# Patient Record
Sex: MALE | Race: BLACK OR AFRICAN AMERICAN | Employment: UNEMPLOYED | URBAN - METROPOLITAN AREA
[De-identification: names, ages, dates, MRNs, and addresses within clinical notes are randomized per-mention and may not be internally consistent; named-entity substitution may affect disease eponyms.]

---
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
## 2018-01-18 ENCOUNTER — APPOINTMENT (OUTPATIENT)
Dept: ULTRASOUND IMAGING | Age: 18
End: 2018-01-18
Payer: MEDICARE

## 2018-01-18 ENCOUNTER — HOSPITAL ENCOUNTER (EMERGENCY)
Age: 18
Discharge: HOME | End: 2018-01-18
Attending: EMERGENCY MEDICINE
Payer: MEDICARE

## 2018-01-18 VITALS
BODY MASS INDEX: 31.24 KG/M2 | RESPIRATION RATE: 18 BRPM | TEMPERATURE: 97.9 F | SYSTOLIC BLOOD PRESSURE: 142 MMHG | HEIGHT: 74 IN | OXYGEN SATURATION: 98 % | DIASTOLIC BLOOD PRESSURE: 88 MMHG | WEIGHT: 243.39 LBS | HEART RATE: 87 BPM

## 2018-01-18 DIAGNOSIS — L98.491 ULCER OF RIGHT GROIN, LIMITED TO BREAKDOWN OF SKIN (HCC): Primary | ICD-10-CM

## 2018-01-18 DIAGNOSIS — L98.499: ICD-10-CM

## 2018-01-18 DIAGNOSIS — N50.819 TESTICULAR PAIN, UNSPECIFIED: ICD-10-CM

## 2018-01-18 LAB
-: ABNORMAL
AMORPHOUS: ABNORMAL
BACTERIA: ABNORMAL
BILIRUBIN URINE: NEGATIVE
CASTS UA: ABNORMAL /LPF
COLOR: YELLOW
COMMENT UA: ABNORMAL
CRYSTALS, UA: ABNORMAL /HPF
EPITHELIAL CELLS UA: ABNORMAL /HPF (ref 0–5)
GLUCOSE URINE: NEGATIVE
KETONES, URINE: NEGATIVE
LEUKOCYTE ESTERASE, URINE: NEGATIVE
MUCUS: ABNORMAL
NITRITE, URINE: NEGATIVE
OTHER OBSERVATIONS UA: ABNORMAL
PH UA: 7 (ref 5–8)
PROTEIN UA: NEGATIVE
RBC UA: ABNORMAL /HPF (ref 0–2)
RENAL EPITHELIAL, UA: ABNORMAL /HPF
SPECIFIC GRAVITY UA: 1.02 (ref 1–1.03)
TRICHOMONAS: ABNORMAL
TURBIDITY: ABNORMAL
URINE HGB: NEGATIVE
UROBILINOGEN, URINE: ABNORMAL
WBC UA: ABNORMAL /HPF (ref 0–5)
YEAST: ABNORMAL

## 2018-01-18 PROCEDURE — 87491 CHLMYD TRACH DNA AMP PROBE: CPT

## 2018-01-18 PROCEDURE — 93976 VASCULAR STUDY: CPT

## 2018-01-18 PROCEDURE — 81001 URINALYSIS AUTO W/SCOPE: CPT

## 2018-01-18 PROCEDURE — 87591 N.GONORRHOEAE DNA AMP PROB: CPT

## 2018-01-18 PROCEDURE — 99284 EMERGENCY DEPT VISIT MOD MDM: CPT

## 2018-01-18 PROCEDURE — 76870 US EXAM SCROTUM: CPT

## 2018-01-18 RX ORDER — DOXYCYCLINE HYCLATE 100 MG
100 TABLET ORAL 2 TIMES DAILY
Qty: 20 TABLET | Refills: 0 | Status: SHIPPED | OUTPATIENT
Start: 2018-01-18 | End: 2018-06-02 | Stop reason: ALTCHOICE

## 2018-01-18 RX ORDER — IBUPROFEN 600 MG/1
600 TABLET ORAL EVERY 8 HOURS PRN
Qty: 21 TABLET | Refills: 0 | Status: SHIPPED | OUTPATIENT
Start: 2018-01-18 | End: 2018-06-02 | Stop reason: ALTCHOICE

## 2018-01-18 ASSESSMENT — ENCOUNTER SYMPTOMS
RESPIRATORY NEGATIVE: 1
ALLERGIC/IMMUNOLOGIC NEGATIVE: 1
EYES NEGATIVE: 1
GASTROINTESTINAL NEGATIVE: 1

## 2018-01-18 ASSESSMENT — PAIN DESCRIPTION - DESCRIPTORS: DESCRIPTORS: ACHING;CONSTANT;PRESSURE

## 2018-01-18 ASSESSMENT — PAIN SCALES - GENERAL: PAINLEVEL_OUTOF10: 10

## 2018-01-18 ASSESSMENT — PAIN DESCRIPTION - FREQUENCY: FREQUENCY: CONTINUOUS

## 2018-01-18 NOTE — ED PROVIDER NOTES
Decision To Discharge 01/18/2018 02:32:26 PM      PATIENT REFERRED TO:   Sj Geller MD  [address]  [phone]    In 1 week      Edgard Garcia MD  [address] [place] [address]  [phone]    Schedule an appointment as soon as possible for a visit in 1 week        DISCHARGE MEDICATIONS:     New Prescriptions    DOXYCYCLINE HYCLATE (VIBRA-TABS) 100 MG TABLET    Take 1 tablet by mouth 2 times daily    IBUPROFEN (ADVIL;MOTRIN) 600 MG TABLET    Take 1 tablet by mouth every 8 hours as needed for Pain    NEOMYCIN-POLYMYXIN-PRAMOXINE (NEOSPORIN PLUS) 1 % CREAM    Apply topically 2 times daily.            (Please note that portions of this note were completed with a voice recognition program.  Efforts were made to edit the dictations but occasionally words are mis-transcribed.)    Krish Fay MD  Attending Emergency Physician         Krish Fay MD  01/18/18 7569

## 2018-01-18 NOTE — ED NOTES
Ill several days with lower abd/scrotal and rectal pain. Denies injury no fever nausea or urinary difficulties. abd soft.      Gabino Galos, RN  01/18/18 8534

## 2018-01-22 LAB
C. TRACHOMATIS DNA ,URINE: NEGATIVE
N. GONORRHOEAE DNA, URINE: NEGATIVE

## 2018-03-26 ENCOUNTER — APPOINTMENT (OUTPATIENT)
Dept: GENERAL RADIOLOGY | Age: 18
End: 2018-03-26
Payer: MEDICARE

## 2018-03-26 ENCOUNTER — HOSPITAL ENCOUNTER (EMERGENCY)
Age: 18
Discharge: HOME | End: 2018-03-26
Attending: EMERGENCY MEDICINE
Payer: MEDICARE

## 2018-03-26 ENCOUNTER — APPOINTMENT (OUTPATIENT)
Dept: CT IMAGING | Age: 18
End: 2018-03-26
Payer: MEDICARE

## 2018-03-26 VITALS
WEIGHT: 225 LBS | SYSTOLIC BLOOD PRESSURE: 125 MMHG | RESPIRATION RATE: 16 BRPM | BODY MASS INDEX: 28.88 KG/M2 | HEART RATE: 76 BPM | OXYGEN SATURATION: 99 % | DIASTOLIC BLOOD PRESSURE: 63 MMHG | TEMPERATURE: 97.2 F | HEIGHT: 74 IN

## 2018-03-26 DIAGNOSIS — R11.2 NAUSEA AND VOMITING, INTRACTABILITY OF VOMITING NOT SPECIFIED, UNSPECIFIED VOMITING TYPE: ICD-10-CM

## 2018-03-26 DIAGNOSIS — R51.9 NONINTRACTABLE HEADACHE, UNSPECIFIED CHRONICITY PATTERN, UNSPECIFIED HEADACHE TYPE: Primary | ICD-10-CM

## 2018-03-26 LAB
ABSOLUTE EOS #: 0.08 K/UL (ref 0–0.44)
ABSOLUTE IMMATURE GRANULOCYTE: <0.03 K/UL (ref 0–0.3)
ABSOLUTE LYMPH #: 2.09 K/UL (ref 1.2–5.2)
ABSOLUTE MONO #: 0.81 K/UL (ref 0.1–1.4)
ANION GAP SERPL CALCULATED.3IONS-SCNC: 10 MMOL/L (ref 9–17)
BASOPHILS # BLD: 0 % (ref 0–2)
BASOPHILS ABSOLUTE: <0.03 K/UL (ref 0–0.2)
BUN BLDV-MCNC: 18 MG/DL (ref 6–20)
BUN/CREAT BLD: NORMAL (ref 9–20)
CALCIUM SERPL-MCNC: 9.4 MG/DL (ref 8.6–10.4)
CHLORIDE BLD-SCNC: 107 MMOL/L (ref 98–107)
CO2: 24 MMOL/L (ref 20–31)
CREAT SERPL-MCNC: 0.75 MG/DL (ref 0.7–1.2)
DIFFERENTIAL TYPE: ABNORMAL
EOSINOPHILS RELATIVE PERCENT: 1 % (ref 1–4)
GFR AFRICAN AMERICAN: NORMAL ML/MIN
GFR NON-AFRICAN AMERICAN: NORMAL ML/MIN
GFR SERPL CREATININE-BSD FRML MDRD: NORMAL ML/MIN/{1.73_M2}
GFR SERPL CREATININE-BSD FRML MDRD: NORMAL ML/MIN/{1.73_M2}
GLUCOSE BLD-MCNC: 99 MG/DL (ref 70–99)
HCT VFR BLD CALC: 42.4 % (ref 40.7–50.3)
HEMOGLOBIN: 14 G/DL (ref 13–17)
IMMATURE GRANULOCYTES: 0 %
LYMPHOCYTES # BLD: 25 % (ref 25–45)
MCH RBC QN AUTO: 31.5 PG (ref 25.2–33.5)
MCHC RBC AUTO-ENTMCNC: 33 G/DL (ref 28.4–34.8)
MCV RBC AUTO: 95.5 FL (ref 82.6–102.9)
MONOCYTES # BLD: 10 % (ref 2–8)
NRBC AUTOMATED: 0 PER 100 WBC
PDW BLD-RTO: 13.1 % (ref 11.8–14.4)
PLATELET # BLD: 217 K/UL (ref 138–453)
PLATELET ESTIMATE: ABNORMAL
PMV BLD AUTO: 10.3 FL (ref 8.1–13.5)
POTASSIUM SERPL-SCNC: 3.9 MMOL/L (ref 3.7–5.3)
RBC # BLD: 4.44 M/UL (ref 4.21–5.77)
RBC # BLD: ABNORMAL 10*6/UL
SEG NEUTROPHILS: 64 % (ref 34–64)
SEGMENTED NEUTROPHILS ABSOLUTE COUNT: 5.2 K/UL (ref 1.8–8)
SODIUM BLD-SCNC: 141 MMOL/L (ref 135–144)
WBC # BLD: 8.2 K/UL (ref 4.5–13.5)
WBC # BLD: ABNORMAL 10*3/UL

## 2018-03-26 PROCEDURE — 6360000002 HC RX W HCPCS: Performed by: EMERGENCY MEDICINE

## 2018-03-26 PROCEDURE — 71045 X-RAY EXAM CHEST 1 VIEW: CPT

## 2018-03-26 PROCEDURE — 96374 THER/PROPH/DIAG INJ IV PUSH: CPT

## 2018-03-26 PROCEDURE — 99284 EMERGENCY DEPT VISIT MOD MDM: CPT

## 2018-03-26 PROCEDURE — 85025 COMPLETE CBC W/AUTO DIFF WBC: CPT

## 2018-03-26 PROCEDURE — 2580000003 HC RX 258: Performed by: EMERGENCY MEDICINE

## 2018-03-26 PROCEDURE — 80048 BASIC METABOLIC PNL TOTAL CA: CPT

## 2018-03-26 PROCEDURE — 70450 CT HEAD/BRAIN W/O DYE: CPT

## 2018-03-26 PROCEDURE — 96375 TX/PRO/DX INJ NEW DRUG ADDON: CPT

## 2018-03-26 RX ORDER — KETOROLAC TROMETHAMINE 15 MG/ML
15 INJECTION, SOLUTION INTRAMUSCULAR; INTRAVENOUS ONCE
Status: COMPLETED | OUTPATIENT
Start: 2018-03-26 | End: 2018-03-26

## 2018-03-26 RX ORDER — ONDANSETRON 2 MG/ML
4 INJECTION INTRAMUSCULAR; INTRAVENOUS ONCE
Status: COMPLETED | OUTPATIENT
Start: 2018-03-26 | End: 2018-03-26

## 2018-03-26 RX ORDER — IBUPROFEN 800 MG/1
800 TABLET ORAL EVERY 8 HOURS PRN
Qty: 30 TABLET | Refills: 0 | Status: SHIPPED | OUTPATIENT
Start: 2018-03-26 | End: 2018-06-02 | Stop reason: ALTCHOICE

## 2018-03-26 RX ORDER — 0.9 % SODIUM CHLORIDE 0.9 %
1000 INTRAVENOUS SOLUTION INTRAVENOUS ONCE
Status: COMPLETED | OUTPATIENT
Start: 2018-03-26 | End: 2018-03-26

## 2018-03-26 RX ORDER — ONDANSETRON 4 MG/1
4 TABLET, FILM COATED ORAL EVERY 8 HOURS PRN
Qty: 4 TABLET | Refills: 0 | Status: SHIPPED | OUTPATIENT
Start: 2018-03-26 | End: 2018-04-08

## 2018-03-26 RX ADMIN — KETOROLAC TROMETHAMINE 15 MG: 15 INJECTION, SOLUTION INTRAMUSCULAR; INTRAVENOUS at 10:56

## 2018-03-26 RX ADMIN — SODIUM CHLORIDE 1000 ML: 9 INJECTION, SOLUTION INTRAVENOUS at 10:56

## 2018-03-26 RX ADMIN — ONDANSETRON 4 MG: 2 INJECTION INTRAMUSCULAR; INTRAVENOUS at 10:56

## 2018-03-26 ASSESSMENT — ENCOUNTER SYMPTOMS
SHORTNESS OF BREATH: 0
CHEST TIGHTNESS: 0
COLOR CHANGE: 0
CONSTIPATION: 0
BLOOD IN STOOL: 0
DIARRHEA: 0
VOMITING: 1
ABDOMINAL PAIN: 0
NAUSEA: 1

## 2018-03-26 ASSESSMENT — PAIN DESCRIPTION - DESCRIPTORS: DESCRIPTORS: CONSTANT

## 2018-03-26 ASSESSMENT — PAIN DESCRIPTION - PAIN TYPE: TYPE: ACUTE PAIN

## 2018-03-26 ASSESSMENT — PAIN SCALES - WONG BAKER: WONGBAKER_NUMERICALRESPONSE: 8

## 2018-03-26 ASSESSMENT — PAIN SCALES - GENERAL: PAINLEVEL_OUTOF10: 8

## 2018-03-26 ASSESSMENT — PAIN DESCRIPTION - LOCATION: LOCATION: FOOT;HEAD

## 2018-03-26 NOTE — ED NOTES
To CT scan, will mediated upon return to room. [facility] updated.      Minna Mobley RN  03/26/18 7980

## 2018-03-26 NOTE — ED PROVIDER NOTES
MCV 95.5 82.6 - 102.9 fL    MCH 31.5 25.2 - 33.5 pg    MCHC 33.0 28.4 - 34.8 g/dL    RDW 13.1 11.8 - 14.4 %    Platelets 709 187 - 059 k/uL    MPV 10.3 8.1 - 13.5 fL    NRBC Automated 0.0 0.0 per 100 WBC    Differential Type NOT REPORTED     Seg Neutrophils 64 34 - 64 %    Lymphocytes 25 25 - 45 %    Monocytes 10 (H) 2 - 8 %    Eosinophils % 1 1 - 4 %    Basophils 0 0 - 2 %    Immature Granulocytes 0 0 %    Segs Absolute 5.20 1.80 - 8.00 k/uL    Absolute Lymph # 2.09 1.20 - 5.20 k/uL    Absolute Mono # 0.81 0.10 - 1.40 k/uL    Absolute Eos # 0.08 0.00 - 0.44 k/uL    Basophils # <0.03 0.00 - 0.20 k/uL    Absolute Immature Granulocyte <0.03 0.00 - 0.30 k/uL    WBC Morphology NOT REPORTED     RBC Morphology NOT REPORTED     Platelet Estimate NOT REPORTED    BASIC METABOLIC PANEL   Result Value Ref Range    Glucose 99 70 - 99 mg/dL    BUN 18 6 - 20 mg/dL    CREATININE 0.75 0.70 - 1.20 mg/dL    Bun/Cre Ratio NOT REPORTED 9 - 20    Calcium 9.4 8.6 - 10.4 mg/dL    Sodium 141 135 - 144 mmol/L    Potassium 3.9 3.7 - 5.3 mmol/L    Chloride 107 98 - 107 mmol/L    CO2 24 20 - 31 mmol/L    Anion Gap 10 9 - 17 mmol/L    GFR Non-African American  >60 mL/min     Pediatric GFR requires additional information. Refer to [facility] website for    GFR  NOT REPORTED >60 mL/min    GFR Comment          GFR Staging NOT REPORTED        IMPRESSION: 76-year-old male comes in with progressive headaches, concern for nausea vomiting. No nausea or vomiting with the last 1 week, has been tolerating by mouth intake today without emesis. Patient otherwise looks in no acute distress, mild asymmetrical weakness on the right lower extremity compared to the left which is new according to the family. Plan is to go ahead and get a CT head with a shunt series. We'll also do basic blood work as well.     RADIOLOGY:    Ct Head Wo Contrast    Result Date: 3/26/2018  EXAMINATION: CT OF THE HEAD WITHOUT CONTRAST  3/26/2018 10:49 am TECHNIQUE: CT of the head was performed without the administration of intravenous contrast. Dose modulation, iterative reconstruction, and/or weight based adjustment of the mA/kV was utilized to reduce the radiation dose to as low as reasonably achievable. COMPARISON: MRI brain July 8, 2015 in June 19, 2012 CT brain July 5, 2011 HISTORY: ORDERING SYSTEM PROVIDED HISTORY: Child with cysts, concern for growin cysts vs shunt dysfunction. + Headache, seizures, emesis. FINDINGS: BRAIN/VENTRICLES: No acute intracranial hemorrhage, mass effect or midline shift. No abnormal extra-axial fluid collection. The gray-white differentiation is maintained without evidence of an acute infarct. No evidence of hydrocephalus. Chiari malformation demonstrated. Stable left temporal arachnoid cyst with associated shunt. ORBITS: The visualized portion of the orbits demonstrate no acute abnormality. SINUSES: The visualized paranasal sinuses and mastoid air cells demonstrate no acute abnormality. SOFT TISSUES/SKULL:  No acute abnormality of the visualized skull or soft tissues. Stable CT brain with no acute intracranial abnormality and re- demonstration of shunted left temporal arachnoid cyst.     Xr Shunt Series Placement (<4 Views)    Result Date: 3/26/2018  EXAMINATION: SHUNT SERIES 3/26/2018 10:47 am COMPARISON: None. HISTORY: ORDERING SYSTEM PROVIDED HISTORY: R/O Shunt dysfunction TECHNOLOGIST PROVIDED HISTORY: Reason for exam:->R/O Shunt dysfunction FINDINGS: A ventriculoperitoneal shunt is noted, proximal end of the shunt located 4 cm lateral to midline in the left skull at the level of the sella turcica. The shunt extends along the left hemithorax terminating in the left mid abdomen, looping in the left distal abdomen. No shunt kinking is noted. Osseous structures are intact. Within the chest, heart size is top-normal.  Bowel gas pattern is nonspecific. Left-sided ventriculoperitoneal shunt in situ as above.   Distal end addition scheduled. DISCHARGE MEDICATIONS:  Discharge Medication List as of 3/26/2018 12:57 PM      START taking these medications    Details   !! ibuprofen (ADVIL;MOTRIN) 800 MG tablet Take 1 tablet by mouth every 8 hours as needed for Pain, Disp-30 tablet, R-0Print      ondansetron (ZOFRAN) 4 MG tablet Take 1 tablet by mouth every 8 hours as needed for Nausea, Disp-4 tablet, R-0Print       !! - Potential duplicate medications found. Please discuss with provider.           Burton Lomas MD  Emergency Medicine Resident    (Please note that portions of this note were completed with a voice recognition program.  Efforts were made to edit the dictations but occasionally words are mis-transcribed.)       Burton Lomas MD  03/26/18 9759

## 2018-03-26 NOTE — ED NOTES
Pt is reporting \"my right side feels weak again\", right sided weakness is now appreciated. Dr Sammy Nuñez aware.       Kaye Paget, RN  03/26/18 1016

## 2018-04-08 ENCOUNTER — HOSPITAL ENCOUNTER (EMERGENCY)
Age: 18
Discharge: HOME | End: 2018-04-08
Attending: EMERGENCY MEDICINE
Payer: MEDICARE

## 2018-04-08 ENCOUNTER — APPOINTMENT (OUTPATIENT)
Dept: CT IMAGING | Age: 18
End: 2018-04-08
Payer: MEDICARE

## 2018-04-08 ENCOUNTER — APPOINTMENT (OUTPATIENT)
Dept: GENERAL RADIOLOGY | Age: 18
End: 2018-04-08
Payer: MEDICARE

## 2018-04-08 VITALS
RESPIRATION RATE: 18 BRPM | SYSTOLIC BLOOD PRESSURE: 142 MMHG | HEART RATE: 67 BPM | DIASTOLIC BLOOD PRESSURE: 78 MMHG | OXYGEN SATURATION: 100 % | TEMPERATURE: 98.6 F

## 2018-04-08 DIAGNOSIS — R51.9 NONINTRACTABLE HEADACHE, UNSPECIFIED CHRONICITY PATTERN, UNSPECIFIED HEADACHE TYPE: Primary | ICD-10-CM

## 2018-04-08 PROCEDURE — 74018 RADEX ABDOMEN 1 VIEW: CPT

## 2018-04-08 PROCEDURE — 6370000000 HC RX 637 (ALT 250 FOR IP): Performed by: STUDENT IN AN ORGANIZED HEALTH CARE EDUCATION/TRAINING PROGRAM

## 2018-04-08 PROCEDURE — 99284 EMERGENCY DEPT VISIT MOD MDM: CPT

## 2018-04-08 PROCEDURE — 70450 CT HEAD/BRAIN W/O DYE: CPT

## 2018-04-08 PROCEDURE — 6360000002 HC RX W HCPCS: Performed by: STUDENT IN AN ORGANIZED HEALTH CARE EDUCATION/TRAINING PROGRAM

## 2018-04-08 RX ORDER — ONDANSETRON 4 MG/1
4 TABLET, FILM COATED ORAL EVERY 8 HOURS PRN
Qty: 9 TABLET | Refills: 0 | Status: SHIPPED | OUTPATIENT
Start: 2018-04-08 | End: 2018-06-02 | Stop reason: ALTCHOICE

## 2018-04-08 RX ORDER — ONDANSETRON 4 MG/1
4 TABLET, FILM COATED ORAL ONCE
Status: COMPLETED | OUTPATIENT
Start: 2018-04-08 | End: 2018-04-08

## 2018-04-08 RX ORDER — ACETAMINOPHEN 325 MG/1
650 TABLET ORAL ONCE
Status: COMPLETED | OUTPATIENT
Start: 2018-04-08 | End: 2018-04-08

## 2018-04-08 RX ORDER — ACETAMINOPHEN 325 MG/1
650 TABLET ORAL EVERY 6 HOURS PRN
Qty: 30 TABLET | Refills: 0 | Status: SHIPPED | OUTPATIENT
Start: 2018-04-08 | End: 2018-06-02

## 2018-04-08 RX ADMIN — ONDANSETRON 4 MG: 4 TABLET, FILM COATED ORAL at 10:03

## 2018-04-08 RX ADMIN — ACETAMINOPHEN 650 MG: 325 TABLET ORAL at 10:03

## 2018-04-08 ASSESSMENT — PAIN SCALES - GENERAL
PAINLEVEL_OUTOF10: 10
PAINLEVEL_OUTOF10: 10

## 2018-04-08 ASSESSMENT — ENCOUNTER SYMPTOMS
VOMITING: 1
SHORTNESS OF BREATH: 0
CONSTIPATION: 0
RHINORRHEA: 0
ABDOMINAL PAIN: 0
EYE REDNESS: 0
NAUSEA: 1
BACK PAIN: 0
DIARRHEA: 0
COUGH: 0
EYE ITCHING: 0

## 2018-04-08 ASSESSMENT — PAIN DESCRIPTION - LOCATION: LOCATION: HEAD

## 2018-05-28 ENCOUNTER — HOSPITAL ENCOUNTER (EMERGENCY)
Age: 18
Discharge: HOME | End: 2018-05-29
Attending: EMERGENCY MEDICINE
Payer: MEDICARE

## 2018-05-28 VITALS
RESPIRATION RATE: 20 BRPM | TEMPERATURE: 97.9 F | OXYGEN SATURATION: 96 % | HEART RATE: 53 BPM | SYSTOLIC BLOOD PRESSURE: 146 MMHG | DIASTOLIC BLOOD PRESSURE: 74 MMHG

## 2018-05-28 DIAGNOSIS — N48.30 PRIAPISM: Primary | ICD-10-CM

## 2018-05-28 PROCEDURE — 81001 URINALYSIS AUTO W/SCOPE: CPT

## 2018-05-28 PROCEDURE — 99284 EMERGENCY DEPT VISIT MOD MDM: CPT

## 2018-05-28 PROCEDURE — 87591 N.GONORRHOEAE DNA AMP PROB: CPT

## 2018-05-28 PROCEDURE — 87491 CHLMYD TRACH DNA AMP PROBE: CPT

## 2018-05-28 PROCEDURE — 87086 URINE CULTURE/COLONY COUNT: CPT

## 2018-05-28 RX ORDER — FENTANYL CITRATE 50 UG/ML
50 INJECTION, SOLUTION INTRAMUSCULAR; INTRAVENOUS ONCE
Status: COMPLETED | OUTPATIENT
Start: 2018-05-29 | End: 2018-05-29

## 2018-05-28 RX ORDER — 0.9 % SODIUM CHLORIDE 0.9 %
1000 INTRAVENOUS SOLUTION INTRAVENOUS ONCE
Status: COMPLETED | OUTPATIENT
Start: 2018-05-29 | End: 2018-05-29

## 2018-05-28 RX ORDER — PROMETHAZINE HYDROCHLORIDE 25 MG/ML
12.5 INJECTION, SOLUTION INTRAMUSCULAR; INTRAVENOUS ONCE
Status: COMPLETED | OUTPATIENT
Start: 2018-05-29 | End: 2018-05-29

## 2018-05-28 ASSESSMENT — PAIN DESCRIPTION - LOCATION: LOCATION: PENIS

## 2018-05-28 ASSESSMENT — PAIN DESCRIPTION - PAIN TYPE: TYPE: ACUTE PAIN

## 2018-05-28 ASSESSMENT — PAIN SCALES - GENERAL: PAINLEVEL_OUTOF10: 5

## 2018-05-29 LAB
-: ABNORMAL
ABSOLUTE EOS #: 0.05 K/UL (ref 0–0.44)
ABSOLUTE IMMATURE GRANULOCYTE: 0.04 K/UL (ref 0–0.3)
ABSOLUTE LYMPH #: 2.3 K/UL (ref 1.2–5.2)
ABSOLUTE MONO #: 1.03 K/UL (ref 0.1–1.4)
AMORPHOUS: ABNORMAL
ANION GAP SERPL CALCULATED.3IONS-SCNC: 15 MMOL/L (ref 9–17)
BACTERIA: ABNORMAL
BASOPHILS # BLD: 0 % (ref 0–2)
BASOPHILS ABSOLUTE: 0.03 K/UL (ref 0–0.2)
BILIRUBIN URINE: NEGATIVE
BUN BLDV-MCNC: 14 MG/DL (ref 6–20)
BUN/CREAT BLD: ABNORMAL (ref 9–20)
CALCIUM SERPL-MCNC: 9.4 MG/DL (ref 8.6–10.4)
CASTS UA: ABNORMAL /LPF (ref 0–8)
CHLORIDE BLD-SCNC: 103 MMOL/L (ref 98–107)
CO2: 22 MMOL/L (ref 20–31)
COLOR: YELLOW
CREAT SERPL-MCNC: 0.98 MG/DL (ref 0.7–1.2)
CRYSTALS, UA: ABNORMAL /HPF
DIFFERENTIAL TYPE: ABNORMAL
EOSINOPHILS RELATIVE PERCENT: 0 % (ref 1–4)
EPITHELIAL CELLS UA: ABNORMAL /HPF (ref 0–5)
GFR AFRICAN AMERICAN: ABNORMAL ML/MIN
GFR NON-AFRICAN AMERICAN: ABNORMAL ML/MIN
GFR SERPL CREATININE-BSD FRML MDRD: ABNORMAL ML/MIN/{1.73_M2}
GFR SERPL CREATININE-BSD FRML MDRD: ABNORMAL ML/MIN/{1.73_M2}
GLUCOSE BLD-MCNC: 87 MG/DL (ref 70–99)
GLUCOSE URINE: NEGATIVE
HCT VFR BLD CALC: 44.4 % (ref 40.7–50.3)
HEMOGLOBIN: 14.8 G/DL (ref 13–17)
IMMATURE GRANULOCYTES: 0 %
KETONES, URINE: ABNORMAL
LEUKOCYTE ESTERASE, URINE: ABNORMAL
LYMPHOCYTES # BLD: 18 % (ref 25–45)
MCH RBC QN AUTO: 31.1 PG (ref 25.2–33.5)
MCHC RBC AUTO-ENTMCNC: 33.3 G/DL (ref 28.4–34.8)
MCV RBC AUTO: 93.3 FL (ref 82.6–102.9)
MONOCYTES # BLD: 8 % (ref 2–8)
MUCUS: ABNORMAL
NITRITE, URINE: NEGATIVE
NRBC AUTOMATED: 0 PER 100 WBC
OTHER OBSERVATIONS UA: ABNORMAL
PDW BLD-RTO: 13 % (ref 11.8–14.4)
PH UA: 6.5 (ref 5–8)
PLATELET # BLD: 227 K/UL (ref 138–453)
PLATELET ESTIMATE: ABNORMAL
PMV BLD AUTO: 10.4 FL (ref 8.1–13.5)
POTASSIUM SERPL-SCNC: 3.6 MMOL/L (ref 3.7–5.3)
PROTEIN UA: NEGATIVE
RBC # BLD: 4.76 M/UL (ref 4.21–5.77)
RBC # BLD: ABNORMAL 10*6/UL
RBC UA: ABNORMAL /HPF (ref 0–4)
RENAL EPITHELIAL, UA: ABNORMAL /HPF
SEG NEUTROPHILS: 74 % (ref 34–64)
SEGMENTED NEUTROPHILS ABSOLUTE COUNT: 9.08 K/UL (ref 1.8–8)
SODIUM BLD-SCNC: 140 MMOL/L (ref 135–144)
SPECIFIC GRAVITY UA: 1.03 (ref 1–1.03)
TRICHOMONAS: ABNORMAL
TURBIDITY: ABNORMAL
URINE HGB: NEGATIVE
UROBILINOGEN, URINE: NORMAL
WBC # BLD: 12.5 K/UL (ref 4.5–13.5)
WBC # BLD: ABNORMAL 10*3/UL
WBC UA: ABNORMAL /HPF (ref 0–5)
YEAST: ABNORMAL

## 2018-05-29 PROCEDURE — 80048 BASIC METABOLIC PNL TOTAL CA: CPT

## 2018-05-29 PROCEDURE — 6370000000 HC RX 637 (ALT 250 FOR IP): Performed by: EMERGENCY MEDICINE

## 2018-05-29 PROCEDURE — 2580000003 HC RX 258: Performed by: EMERGENCY MEDICINE

## 2018-05-29 PROCEDURE — 96374 THER/PROPH/DIAG INJ IV PUSH: CPT

## 2018-05-29 PROCEDURE — 6360000002 HC RX W HCPCS: Performed by: EMERGENCY MEDICINE

## 2018-05-29 PROCEDURE — 96375 TX/PRO/DX INJ NEW DRUG ADDON: CPT

## 2018-05-29 PROCEDURE — 85025 COMPLETE CBC W/AUTO DIFF WBC: CPT

## 2018-05-29 RX ORDER — LIDOCAINE HYDROCHLORIDE 10 MG/ML
20 INJECTION, SOLUTION INFILTRATION; PERINEURAL ONCE
Status: DISCONTINUED | OUTPATIENT
Start: 2018-05-29 | End: 2018-05-29 | Stop reason: HOSPADM

## 2018-05-29 RX ORDER — IBUPROFEN 800 MG/1
800 TABLET ORAL EVERY 8 HOURS PRN
Qty: 30 TABLET | Refills: 0 | Status: SHIPPED | OUTPATIENT
Start: 2018-05-29 | End: 2018-06-02 | Stop reason: ALTCHOICE

## 2018-05-29 RX ORDER — SULFAMETHOXAZOLE AND TRIMETHOPRIM 800; 160 MG/1; MG/1
1 TABLET ORAL 2 TIMES DAILY
Qty: 6 TABLET | Refills: 0 | Status: SHIPPED | OUTPATIENT
Start: 2018-05-29 | End: 2018-06-01

## 2018-05-29 RX ORDER — SULFAMETHOXAZOLE AND TRIMETHOPRIM 800; 160 MG/1; MG/1
1 TABLET ORAL ONCE
Status: COMPLETED | OUTPATIENT
Start: 2018-05-29 | End: 2018-05-29

## 2018-05-29 RX ADMIN — FENTANYL CITRATE 50 MCG: 50 INJECTION INTRAMUSCULAR; INTRAVENOUS at 00:43

## 2018-05-29 RX ADMIN — SODIUM CHLORIDE 1000 ML: 9 INJECTION, SOLUTION INTRAVENOUS at 00:45

## 2018-05-29 RX ADMIN — PROMETHAZINE HYDROCHLORIDE 12.5 MG: 25 INJECTION INTRAMUSCULAR; INTRAVENOUS at 00:42

## 2018-05-29 RX ADMIN — SULFAMETHOXAZOLE AND TRIMETHOPRIM 1 TABLET: 800; 160 TABLET ORAL at 02:56

## 2018-05-30 LAB
C. TRACHOMATIS DNA ,URINE: NEGATIVE
CULTURE: NO GROWTH
CULTURE: NORMAL
Lab: NORMAL
N. GONORRHOEAE DNA, URINE: NEGATIVE
SPECIMEN DESCRIPTION: NORMAL
STATUS: NORMAL

## 2018-05-31 ASSESSMENT — ENCOUNTER SYMPTOMS
DIARRHEA: 0
SHORTNESS OF BREATH: 0
ABDOMINAL PAIN: 0
VOMITING: 0
NAUSEA: 0
RHINORRHEA: 0
WHEEZING: 0
CONSTIPATION: 0
COUGH: 0
SORE THROAT: 0

## 2018-06-02 ENCOUNTER — HOSPITAL ENCOUNTER (EMERGENCY)
Age: 18
Discharge: HOME | End: 2018-06-02
Attending: EMERGENCY MEDICINE
Payer: MEDICARE

## 2018-06-02 VITALS
RESPIRATION RATE: 18 BRPM | HEART RATE: 57 BPM | SYSTOLIC BLOOD PRESSURE: 134 MMHG | TEMPERATURE: 98.2 F | OXYGEN SATURATION: 98 % | DIASTOLIC BLOOD PRESSURE: 68 MMHG

## 2018-06-02 DIAGNOSIS — G43.809 OTHER MIGRAINE WITHOUT STATUS MIGRAINOSUS, NOT INTRACTABLE: Primary | ICD-10-CM

## 2018-06-02 PROCEDURE — 6370000000 HC RX 637 (ALT 250 FOR IP): Performed by: EMERGENCY MEDICINE

## 2018-06-02 PROCEDURE — 6360000002 HC RX W HCPCS: Performed by: EMERGENCY MEDICINE

## 2018-06-02 PROCEDURE — 99283 EMERGENCY DEPT VISIT LOW MDM: CPT

## 2018-06-02 RX ORDER — ACETAMINOPHEN 325 MG/1
650 TABLET ORAL ONCE
Status: COMPLETED | OUTPATIENT
Start: 2018-06-02 | End: 2018-06-02

## 2018-06-02 RX ORDER — ONDANSETRON 4 MG/1
4 TABLET, ORALLY DISINTEGRATING ORAL EVERY 8 HOURS PRN
Qty: 20 TABLET | Refills: 0 | Status: SHIPPED | OUTPATIENT
Start: 2018-06-02

## 2018-06-02 RX ORDER — ONDANSETRON 4 MG/1
4 TABLET, ORALLY DISINTEGRATING ORAL ONCE
Status: COMPLETED | OUTPATIENT
Start: 2018-06-02 | End: 2018-06-02

## 2018-06-02 RX ORDER — ACETAMINOPHEN 325 MG/1
650 TABLET ORAL EVERY 6 HOURS PRN
Qty: 30 TABLET | Refills: 0 | Status: SHIPPED | OUTPATIENT
Start: 2018-06-02

## 2018-06-02 RX ADMIN — ACETAMINOPHEN 650 MG: 325 TABLET ORAL at 18:08

## 2018-06-02 RX ADMIN — ONDANSETRON 4 MG: 4 TABLET, ORALLY DISINTEGRATING ORAL at 18:09

## 2018-06-02 ASSESSMENT — PAIN DESCRIPTION - DESCRIPTORS: DESCRIPTORS: HEADACHE

## 2018-06-02 ASSESSMENT — PAIN DESCRIPTION - LOCATION: LOCATION: HEAD

## 2018-06-02 ASSESSMENT — PAIN SCALES - GENERAL
PAINLEVEL_OUTOF10: 5
PAINLEVEL_OUTOF10: 5

## 2018-06-03 ASSESSMENT — ENCOUNTER SYMPTOMS
NAUSEA: 1
RHINORRHEA: 0
PHOTOPHOBIA: 1
FACIAL SWELLING: 0
SINUS PRESSURE: 0
ABDOMINAL PAIN: 0
VOMITING: 1
SHORTNESS OF BREATH: 0
DIARRHEA: 0

## 2018-06-08 ENCOUNTER — OFFICE VISIT (OUTPATIENT)
Dept: UROLOGY | Age: 18
End: 2018-06-08
Payer: MEDICARE

## 2018-06-08 VITALS
DIASTOLIC BLOOD PRESSURE: 71 MMHG | HEIGHT: 74 IN | HEART RATE: 61 BPM | BODY MASS INDEX: 26.67 KG/M2 | WEIGHT: 207.8 LBS | SYSTOLIC BLOOD PRESSURE: 121 MMHG

## 2018-06-08 DIAGNOSIS — N48.30 PRIAPISM: Primary | ICD-10-CM

## 2018-06-08 LAB
BILIRUBIN, POC: NORMAL
BLOOD URINE, POC: NORMAL
CLARITY, POC: CLEAR
COLOR, POC: NORMAL
GLUCOSE URINE, POC: NORMAL
KETONES, POC: NORMAL
LEUKOCYTE EST, POC: NORMAL
NITRITE, POC: NORMAL
PH, POC: 6.5
PROTEIN, POC: NORMAL
SPECIFIC GRAVITY, POC: 1.03
UROBILINOGEN, POC: 0.2

## 2018-06-08 PROCEDURE — 99213 OFFICE O/P EST LOW 20 MIN: CPT | Performed by: UROLOGY

## 2018-06-08 PROCEDURE — 99202 OFFICE O/P NEW SF 15 MIN: CPT

## 2018-06-08 PROCEDURE — 81003 URINALYSIS AUTO W/O SCOPE: CPT | Performed by: UROLOGY

## 2018-06-08 PROCEDURE — 4004F PT TOBACCO SCREEN RCVD TLK: CPT | Performed by: UROLOGY

## 2018-06-08 PROCEDURE — G8427 DOCREV CUR MEDS BY ELIG CLIN: HCPCS | Performed by: UROLOGY

## 2018-06-08 PROCEDURE — G8419 CALC BMI OUT NRM PARAM NOF/U: HCPCS | Performed by: UROLOGY

## 2018-07-14 ENCOUNTER — HOSPITAL ENCOUNTER (EMERGENCY)
Age: 18
Discharge: HOME | End: 2018-07-14
Attending: EMERGENCY MEDICINE
Payer: MEDICARE

## 2018-07-14 VITALS
WEIGHT: 207 LBS | HEIGHT: 74 IN | RESPIRATION RATE: 15 BRPM | TEMPERATURE: 97.2 F | SYSTOLIC BLOOD PRESSURE: 123 MMHG | HEART RATE: 45 BPM | OXYGEN SATURATION: 97 % | BODY MASS INDEX: 26.56 KG/M2 | DIASTOLIC BLOOD PRESSURE: 68 MMHG

## 2018-07-14 DIAGNOSIS — N48.30 PRIAPISM: Primary | ICD-10-CM

## 2018-07-14 LAB
ALLEN TEST: ABNORMAL
FIO2: ABNORMAL
HCO3 VENOUS: 26.5 MMOL/L (ref 22–29)
MODE: ABNORMAL
NEGATIVE BASE EXCESS, VEN: 8 (ref 0–2)
O2 DEVICE/FLOW/%: ABNORMAL
O2 SAT, VEN: 10 % (ref 60–85)
PATIENT TEMP: ABNORMAL
PCO2, VEN: 96 MM HG (ref 41–51)
PH VENOUS: 7.05 (ref 7.32–7.43)
PO2, VEN: 15.2 MM HG (ref 30–50)
POC PCO2 TEMP: ABNORMAL MM HG
POC PH TEMP: ABNORMAL
POC PO2 TEMP: ABNORMAL MM HG
POSITIVE BASE EXCESS, VEN: ABNORMAL (ref 0–3)
SAMPLE SITE: ABNORMAL
TOTAL CO2, VENOUS: 29 MMOL/L (ref 23–30)

## 2018-07-14 PROCEDURE — 6360000002 HC RX W HCPCS: Performed by: EMERGENCY MEDICINE

## 2018-07-14 PROCEDURE — 96375 TX/PRO/DX INJ NEW DRUG ADDON: CPT

## 2018-07-14 PROCEDURE — 6360000002 HC RX W HCPCS

## 2018-07-14 PROCEDURE — 96372 THER/PROPH/DIAG INJ SC/IM: CPT

## 2018-07-14 PROCEDURE — 2500000003 HC RX 250 WO HCPCS: Performed by: EMERGENCY MEDICINE

## 2018-07-14 PROCEDURE — 96374 THER/PROPH/DIAG INJ IV PUSH: CPT

## 2018-07-14 PROCEDURE — 82803 BLOOD GASES ANY COMBINATION: CPT

## 2018-07-14 PROCEDURE — 99284 EMERGENCY DEPT VISIT MOD MDM: CPT

## 2018-07-14 RX ORDER — MORPHINE SULFATE 4 MG/ML
4 INJECTION, SOLUTION INTRAMUSCULAR; INTRAVENOUS ONCE
Status: COMPLETED | OUTPATIENT
Start: 2018-07-14 | End: 2018-07-14

## 2018-07-14 RX ORDER — MORPHINE SULFATE 4 MG/ML
4 INJECTION, SOLUTION INTRAMUSCULAR; INTRAVENOUS ONCE
Status: DISCONTINUED | OUTPATIENT
Start: 2018-07-14 | End: 2018-07-14 | Stop reason: HOSPADM

## 2018-07-14 RX ORDER — MORPHINE SULFATE 4 MG/ML
INJECTION, SOLUTION INTRAMUSCULAR; INTRAVENOUS
Status: COMPLETED
Start: 2018-07-14 | End: 2018-07-14

## 2018-07-14 RX ORDER — MIDAZOLAM HYDROCHLORIDE 1 MG/ML
1 INJECTION INTRAMUSCULAR; INTRAVENOUS ONCE
Status: COMPLETED | OUTPATIENT
Start: 2018-07-14 | End: 2018-07-14

## 2018-07-14 RX ORDER — LIDOCAINE HYDROCHLORIDE 10 MG/ML
20 INJECTION, SOLUTION INFILTRATION; PERINEURAL ONCE
Status: COMPLETED | OUTPATIENT
Start: 2018-07-14 | End: 2018-07-14

## 2018-07-14 RX ADMIN — MORPHINE SULFATE 4 MG: 4 INJECTION INTRAVENOUS at 07:45

## 2018-07-14 RX ADMIN — MORPHINE SULFATE 4 MG: 4 INJECTION INTRAVENOUS at 08:08

## 2018-07-14 RX ADMIN — PHENYLEPHRINE HYDROCHLORIDE 0.3 MG: 10 INJECTION INTRAVENOUS at 08:08

## 2018-07-14 RX ADMIN — LIDOCAINE HYDROCHLORIDE 20 ML: 10 INJECTION, SOLUTION INFILTRATION; PERINEURAL at 07:48

## 2018-07-14 RX ADMIN — MIDAZOLAM HYDROCHLORIDE 1 MG: 1 INJECTION, SOLUTION INTRAMUSCULAR; INTRAVENOUS at 07:45

## 2018-07-14 ASSESSMENT — PAIN SCALES - GENERAL: PAINLEVEL_OUTOF10: 7

## 2018-07-14 ASSESSMENT — PAIN DESCRIPTION - FREQUENCY: FREQUENCY: CONTINUOUS

## 2018-07-14 ASSESSMENT — PAIN DESCRIPTION - PAIN TYPE: TYPE: ACUTE PAIN

## 2018-07-14 ASSESSMENT — PAIN DESCRIPTION - DESCRIPTORS: DESCRIPTORS: BURNING;ITCHING

## 2018-07-14 ASSESSMENT — PAIN DESCRIPTION - LOCATION: LOCATION: PENIS

## 2018-07-14 NOTE — ED PROVIDER NOTES
[facility]  Emergency Department Encounter  Emergency Medicine Resident     Pt Name: Fransisco Koch  MRN: [id number]  [place] 2000  Date of evaluation: 7/14/18  PCP:  Mame De Paz MD    CHIEF COMPLAINT       Chief Complaint   Patient presents with    Erectile Dysfunction     pt. has had erect penis since 04:00, now developing itching and burning sensation       HISTORY OF PRESENT ILLNESS  (Location/Symptom, Timing/Onset, Context/Setting, Quality, Duration, Modifying Factors, Severity.)      Fransisco Koch is a 19 y.o. male who presents with Painful erection. Patient does have a past history of priapism which had to be drained. Denies any prior history of sickle cell disease. Patient is on Abilify and followed up with urology who stated that his might be the Abilify causing his priapism. Patient notes that this started around 4 AM, approximately 3 hours prior to my initial evaluation. He notes pain but denies any dysuria or hematuria. He states that he is an itching sensation but denies any numbness or tingling. PAST MEDICAL / SURGICAL / SOCIAL / FAMILY HISTORY      has a past medical history of History of brain shunt and Subarachnoid cyst.    Surgical history reviewed and negative. Social History     Social History    Marital status: Single     Spouse name: N/A    Number of children: N/A    Years of education: N/A     Occupational History    Not on file. Social History Main Topics    Smoking status: Current Every Day Smoker     Packs/day: 1.00     Types: Cigarettes    Smokeless tobacco: Never Used    Alcohol use No    Drug use: Yes     Types: Marijuana    Sexual activity: Not on file     Other Topics Concern    Not on file     Social History Narrative    No narrative on file       History reviewed. No pertinent family history.     Allergies:  No known allergies    Home Medications:  Prior to Admission medications    Medication Sig Start Date End Date Taking? Authorizing Provider   acetaminophen (TYLENOL) 325 MG tablet Take 2 tablets by mouth every 6 hours as needed for Pain 6/2/18  Yes Twan Segovia MD   ondansetron (ZOFRAN ODT) 4 MG disintegrating tablet Take 1 tablet by mouth every 8 hours as needed for Nausea 6/2/18  Yes Twan Segovia MD   ARIPiprazole (ABILIFY) 9.75 MG/1.3ML injection Inject 9.75 mg into the muscle once   Yes Historical Provider, MD       REVIEW OF SYSTEMS    (2-9 systems for level 4, 10 or more for level 5)      Review of Systems   Genitourinary: Positive for penile pain and penile swelling. Negative for discharge, enuresis and scrotal swelling. Musculoskeletal: Negative for neck pain and neck stiffness. Skin: Negative for pallor and rash. PHYSICAL EXAM   (up to 7 for level 4, 8 or more for level 5)      INITIAL VITALS:   /68   Pulse (!) 45   Temp 97.2 °F (36.2 °C) (Oral)   Resp 15   Ht 6' 2\" (1.88 m)   Wt 207 lb (93.9 kg)   SpO2 97%   BMI 26.58 kg/m²     Physical Exam   Constitutional: He is oriented to person, place, and time. He appears well-developed and well-nourished. No distress. Cardiovascular: Normal rate and regular rhythm. Pulmonary/Chest: Effort normal and breath sounds normal. No respiratory distress. Abdominal: Soft. He exhibits no distension. There is no tenderness. Genitourinary: Circumcised. Penile tenderness present. No phimosis. Genitourinary Comments: Erect penis, no erythema drainage     Musculoskeletal: Normal range of motion. He exhibits no edema. Neurological: He is alert and oriented to person, place, and time. Skin: He is not diaphoretic. Nursing note and vitals reviewed.       DIFFERENTIAL  DIAGNOSIS     PLAN (LABS / IMAGING / EKG):  Orders Placed This Encounter   Procedures    URINALYSIS    Telemetry monitoring    Continuous Pulse Oximetry    Inpatient consult to Urology    Venous Blood Gas, POC    Insert peripheral IV       MEDICATIONS ORDERED:  Orders Placed

## 2018-07-14 NOTE — ED TRIAGE NOTES
Pt. Reports to ED with c/o erection since 04:00. Pt. Reports he was laying down to go to sleep and it wouldn't go down. Pt. Denies taking any ED medications, trauma o injury to back or spine or falls. Pt. Reports he attempted to relieve pressure with masturbation but did not help.

## 2018-07-14 NOTE — ED PROVIDER NOTES
[facility]     Emergency Department     Faculty Attestation    I performed a history and physical examination of the patient and discussed management with the resident. I reviewed the residents note and agree with the documented findings and plan of care. Any areas of disagreement are noted on the chart. I was personally present for the key portions of any procedures. I have documented in the chart those procedures where I was not present during the key portions. I have reviewed the emergency nurses triage note. I agree with the chief complaint, past medical history, past surgical history, allergies, medications, social and family history as documented unless otherwise noted below. For Physician Assistant/ Nurse Practitioner cases/documentation I have personally evaluated this patient and have completed at least one if not all key elements of the E/M (history, physical exam, and MDM). Additional findings are as noted.       Summer Phillip MD  Attending Emergency  Physician              Fredy Smith MD  07/14/18 0761

## 2018-07-14 NOTE — ED NOTES
Pt tolerated procedure well. Pt will remain on full cardiac monitor. Priapism beginning to resolve and pt reports that he feels better.      Cheryl Washington RN  07/14/18 7555

## 2018-07-17 ENCOUNTER — HOSPITAL ENCOUNTER (EMERGENCY)
Age: 18
Discharge: HOME | End: 2018-07-17
Attending: EMERGENCY MEDICINE
Payer: MEDICARE

## 2018-07-17 VITALS
TEMPERATURE: 97.5 F | SYSTOLIC BLOOD PRESSURE: 131 MMHG | BODY MASS INDEX: 26.56 KG/M2 | OXYGEN SATURATION: 100 % | RESPIRATION RATE: 16 BRPM | WEIGHT: 207 LBS | DIASTOLIC BLOOD PRESSURE: 78 MMHG | HEART RATE: 45 BPM | HEIGHT: 74 IN

## 2018-07-17 DIAGNOSIS — N48.33 DRUG-INDUCED PRIAPISM: Primary | ICD-10-CM

## 2018-07-17 PROCEDURE — 54220 IRRG CRPRA CAVRNOSA PRIAPISM: CPT

## 2018-07-17 PROCEDURE — 99284 EMERGENCY DEPT VISIT MOD MDM: CPT

## 2018-07-17 PROCEDURE — 6360000002 HC RX W HCPCS: Performed by: STUDENT IN AN ORGANIZED HEALTH CARE EDUCATION/TRAINING PROGRAM

## 2018-07-17 PROCEDURE — 2500000003 HC RX 250 WO HCPCS: Performed by: STUDENT IN AN ORGANIZED HEALTH CARE EDUCATION/TRAINING PROGRAM

## 2018-07-17 RX ORDER — LIDOCAINE HYDROCHLORIDE 10 MG/ML
25 INJECTION, SOLUTION EPIDURAL; INFILTRATION; INTRACAUDAL; PERINEURAL ONCE
Status: COMPLETED | OUTPATIENT
Start: 2018-07-17 | End: 2018-07-17

## 2018-07-17 RX ORDER — SULFAMETHOXAZOLE AND TRIMETHOPRIM 800; 160 MG/1; MG/1
1 TABLET ORAL 2 TIMES DAILY
Qty: 14 TABLET | Refills: 0 | Status: SHIPPED | OUTPATIENT
Start: 2018-07-17 | End: 2018-07-24

## 2018-07-17 RX ORDER — PHENYLEPHRINE HYDROCHLORIDE 10 MG/ML
50 INJECTION INTRAVENOUS
Status: DISCONTINUED | OUTPATIENT
Start: 2018-07-17 | End: 2018-07-17

## 2018-07-17 RX ADMIN — LIDOCAINE HYDROCHLORIDE 25 ML: 10 INJECTION, SOLUTION EPIDURAL; INFILTRATION; INTRACAUDAL; PERINEURAL at 10:59

## 2018-07-17 RX ADMIN — PHENYLEPHRINE HYDROCHLORIDE 0.2 MG: 10 INJECTION INTRAVENOUS at 11:00

## 2018-07-17 ASSESSMENT — ENCOUNTER SYMPTOMS
VOMITING: 0
ABDOMINAL DISTENTION: 0
ABDOMINAL PAIN: 0
SHORTNESS OF BREATH: 0
NAUSEA: 0

## 2018-07-17 ASSESSMENT — PAIN DESCRIPTION - LOCATION: LOCATION: PENIS

## 2018-07-17 ASSESSMENT — PAIN SCALES - GENERAL: PAINLEVEL_OUTOF10: 10

## 2018-07-17 NOTE — CONSULTS
[place], [place], [facility], [facility]  Urology Consultation      Patient:  Jimmy Pittman  MRN: [id number]  YOB: 2000    CHIEF COMPLAINT:  Priapism    HISTORY OF PRESENT ILLNESS:   The patient is a 19 y.o. male who presents with at least 5 hour painful erection that woke him from sleep approximately 5am this morning. The patient had a similar episode requiring drainage about 1.5 weeks ago. He denies any noteable history, including cocaine use, Sickle Cell Disease/Trait, recent IV contrast/TPN. He is on Abilify. He was previously on trazodone but has not been on it for at least a month. He does note marijuana use. No history of STDs. Patient's old records, notes and chart reviewed and summarized above. Past Medical History:    Past Medical History:   Diagnosis Date    History of brain shunt     Subarachnoid cyst        Past Surgical History:    Past Surgical History:   Procedure Laterality Date    VENTRICULOPERITONEAL SHUNT         Medications:    No current facility-administered medications for this encounter. Current Outpatient Prescriptions:     sulfamethoxazole-trimethoprim (BACTRIM DS) 800-160 MG per tablet, Take 1 tablet by mouth 2 times daily for 7 days, Disp: 14 tablet, Rfl: 0    ARIPiprazole (ABILIFY) 9.75 MG/1.3ML injection, Inject 9.75 mg into the muscle once, Disp: , Rfl:     acetaminophen (TYLENOL) 325 MG tablet, Take 2 tablets by mouth every 6 hours as needed for Pain, Disp: 30 tablet, Rfl: 0    ondansetron (ZOFRAN ODT) 4 MG disintegrating tablet, Take 1 tablet by mouth every 8 hours as needed for Nausea, Disp: 20 tablet, Rfl: 0    Allergies: Allergies   Allergen Reactions    No Known Allergies        Social History:   Social History     Social History    Marital status: Single     Spouse name: N/A    Number of children: N/A    Years of education: N/A     Occupational History    Not on file.      Social History Main Topics    Smoking status: Current Every Day Smoker     Packs/day: 1.00     Types: Cigarettes    Smokeless tobacco: Never Used      Comment: smokes a pack every 3 days    Alcohol use No    Drug use: No    Sexual activity: Not on file     Other Topics Concern    Not on file     Social History Narrative    No narrative on file       Family History:  No family history on file. REVIEW OF SYSTEMS:  A comprehensive 14 point review of systems was obtained. Constitutional: No fatigue  Eyes: No blurry vision  Ears, nose, mouth, throat, face: No ringing in the ears; no facial droop. Respiratory: No cough or cold. Cardiovascular: No palpitations  Gastrointestinal: No diarrhea or constipation. Genitourinary: No burning with urination  Integument/Skin: No rashes  Hematologic/Lymphatic: No easy bruising  Musculoskeletal: No muscle pains  Neurologic: No weakness in the extremities. Psychiatric: No depression or suicidal thoughts. Endocrine: No heat or cold intolerances. Allergic/Immunologic: No current seasonal allergies; no skin hives. Physical Exam:    This a 19 y.o. male   Vitals:    07/17/18 0811   BP: 131/78   Pulse: (!) 45   Resp: 16   Temp: 97.5 °F (36.4 °C)   SpO2: 100%     Constitutional: Patient in no acute distress. Neuro: alert and oriented to person place and time. Head: Atraumatic and normocephalic. Neck: Trachea midline   Ext: 2+ radial pulses bilaterally. Psych: Mood and affect normal.  Skin: No rashes or bruising present. Lungs: Respiratory effort normal.  Cardiovascular:  Regular rhythm. Abdomen: Soft, non-tender, non-distended. Neither side has CVA tenderness on exam.  Bladder non-tender and not distended. Lymphatics: no palpable lymphadenopathy. Penis erect, painful to inspection  Urethral meatus normal  Scrotal exam normal  Testicles normal bilaterally  Epididymis normal bilaterally    Labs:  No results for input(s): WBC, HGB, HCT, MCV, PLT in the last 72 hours.   No results for input(s):

## 2018-07-17 NOTE — ED PROVIDER NOTES
[facility] ED     Emergency Department     Faculty Attestation    I performed a history and physical examination of the patient and discussed management with the resident. I reviewed the residents note and agree with the documented findings and plan of care. Any areas of disagreement are noted on the chart. I was personally present for the key portions of any procedures. I have documented in the chart those procedures where I was not present during the key portions. I have reviewed the emergency nurses triage note. I agree with the chief complaint, past medical history, past surgical history, allergies, medications, social and family history as documented unless otherwise noted below. For Physician Assistant/ Nurse Practitioner cases/documentation I have personally evaluated this patient and have completed at least one if not all key elements of the E/M (history, physical exam, and MDM). Additional findings are as noted. Patient here with priapism, seen recently for same. On exam, penis is erect painful.   We'll call urology as patient is require drainage multiple times including 4 days ago      Critical Care     none    Nadeem Schultz MD, Eva Tucker  Attending Emergency  Physician             Nadeem Schultz MD  07/17/18 [phone]

## 2018-07-17 NOTE — ED NOTES
Pt tolerated procedure well. No complaints at this time. Will continue to monitor pt.      Manuela Espinoza, ODETTE  07/17/18 2070
Urology at bedside.      Angie Guzmán RN  07/17/18 1815
voice recognition program.  Efforts were made to edit the dictations but occasionally words are mis-transcribed.)     Laith Quigley MD  Resident  07/17/18 9081

## 2018-07-17 NOTE — ED PROVIDER NOTES
Expand All Collapse All    []Manual[]Template  []Copied         [facility]  Emergency Department Encounter  Emergency Medicine Resident      Pt Name: Brandon Pro  MRN: [id number]  [place] 2000  Date of evaluation: 7/17/18  PCP:  No primary care provider on file.     CHIEF COMPLAINT            Chief Complaint   Patient presents with    Penis Pain         HISTORY OF PRESENT ILLNESS  (Location/Symptom, Timing/Onset, Context/Setting, Quality, Duration, Modifying Factors, Severity.)       Brandon Pro is a 19 y.o. male who presents with an erection since 0400 after waking up. He denies taking any erectile dysfunction medication. Pt states he takes Abilify which may be the cause. He had 10/10 pain and tenderness. Denies hx of sickle cell. Denies any trauma, dysuria, discharge, or hematuria. He has been seen previously for similar episodes.       PAST MEDICAL / SURGICAL / SOCIAL / FAMILY HISTORY       has a past medical history of History of brain shunt and Subarachnoid cyst.      has a past surgical history that includes Ventriculoperitoneal shunt.     Social History   Social History      Social History    Marital status: Single       Spouse name: N/A    Number of children: N/A    Years of education: N/A          Occupational History    Not on file.             Social History Main Topics    Smoking status: Current Every Day Smoker       Packs/day: 1.00       Types: Cigarettes    Smokeless tobacco: Never Used         Comment: smokes a pack every 3 days    Alcohol use No    Drug use: No    Sexual activity: Not on file           Other Topics Concern    Not on file          Social History Narrative    No narrative on file            Family History   No family history on file.        Allergies:  No known allergies     Home Medications:  Home Medications           Prior to Admission medications    Medication Sig Start Date End Date Taking?  Authorizing Provider sulfamethoxazole-trimethoprim (BACTRIM DS) 800-160 MG per tablet Take 1 tablet by mouth 2 times daily for 7 days 7/17/18 7/24/18 Yes Josey Alaniz MD   ARIPiprazole (ABILIFY) 9.75 MG/1.3ML injection Inject 9.75 mg into the muscle once     Yes Historical Provider, MD   acetaminophen (TYLENOL) 325 MG tablet Take 2 tablets by mouth every 6 hours as needed for Pain 6/2/18     Aamir Kline MD   ondansetron (ZOFRAN ODT) 4 MG disintegrating tablet Take 1 tablet by mouth every 8 hours as needed for Nausea 6/2/18     Aamir Kline MD            REVIEW OF SYSTEMS    (2-9 systems for level 4, 10 or more for level 5)       Review of Systems   Constitutional: Negative for chills, diaphoresis and fever. Respiratory: Negative for shortness of breath. Cardiovascular: Negative for chest pain and leg swelling. Gastrointestinal: Negative for abdominal distention, abdominal pain, nausea and vomiting. Genitourinary: Positive for penile pain and penile swelling. Negative for difficulty urinating, discharge, dysuria, hematuria, scrotal swelling and testicular pain. Neurological: Negative for light-headedness.      PHYSICAL EXAM   (up to 7 for level 4, 8 or more for level 5)       INITIAL VITALS:   /78   Pulse (!) 45   Temp 97.5 °F (36.4 °C) (Oral)   Resp 16   Ht 6' 2\" (1.88 m)   Wt 207 lb (93.9 kg)   SpO2 100%   BMI 26.58 kg/m²      Physical Exam   Constitutional: He appears well-developed and well-nourished. No distress. HENT:   Head: Normocephalic and atraumatic. Eyes: EOM are normal. Pupils are equal, round, and reactive to light. Cardiovascular: Normal rate, regular rhythm, normal heart sounds and intact distal pulses. Pulmonary/Chest: Effort normal and breath sounds normal. No respiratory distress. He has no wheezes. Abdominal: Soft. Bowel sounds are normal. He exhibits no distension. There is no tenderness. Genitourinary: Testes normal. Penile tenderness present. No discharge found.

## 2018-07-22 ENCOUNTER — HOSPITAL ENCOUNTER (EMERGENCY)
Age: 18
Discharge: HOME | End: 2018-07-22
Attending: EMERGENCY MEDICINE
Payer: MEDICARE

## 2018-07-22 VITALS
SYSTOLIC BLOOD PRESSURE: 130 MMHG | HEART RATE: 46 BPM | OXYGEN SATURATION: 99 % | DIASTOLIC BLOOD PRESSURE: 79 MMHG | RESPIRATION RATE: 17 BRPM | TEMPERATURE: 97.3 F

## 2018-07-22 DIAGNOSIS — N48.30 PRIAPISM: Primary | ICD-10-CM

## 2018-07-22 PROCEDURE — 54220 IRRG CRPRA CAVRNOSA PRIAPISM: CPT

## 2018-07-22 PROCEDURE — 6370000000 HC RX 637 (ALT 250 FOR IP): Performed by: STUDENT IN AN ORGANIZED HEALTH CARE EDUCATION/TRAINING PROGRAM

## 2018-07-22 PROCEDURE — 99284 EMERGENCY DEPT VISIT MOD MDM: CPT

## 2018-07-22 RX ORDER — HYDROCODONE BITARTRATE AND ACETAMINOPHEN 5; 325 MG/1; MG/1
1 TABLET ORAL ONCE
Status: COMPLETED | OUTPATIENT
Start: 2018-07-22 | End: 2018-07-22

## 2018-07-22 RX ORDER — LIDOCAINE HYDROCHLORIDE 10 MG/ML
20 INJECTION, SOLUTION INFILTRATION; PERINEURAL ONCE
Status: DISCONTINUED | OUTPATIENT
Start: 2018-07-22 | End: 2018-07-22 | Stop reason: HOSPADM

## 2018-07-22 RX ADMIN — HYDROCODONE BITARTRATE AND ACETAMINOPHEN 1 TABLET: 5; 325 TABLET ORAL at 11:10

## 2018-07-22 ASSESSMENT — ENCOUNTER SYMPTOMS
SHORTNESS OF BREATH: 0
NAUSEA: 0
VOMITING: 0
ABDOMINAL PAIN: 0

## 2018-07-22 ASSESSMENT — PAIN SCALES - GENERAL: PAINLEVEL_OUTOF10: 10

## 2018-07-22 NOTE — CONSULTS
ibuprofen and supportive care  - Counseled patient to stop marijuana, liquor, and abilify. Family plans to see PCP to get sickle cell workup. He plans to follow up with Dr. Elizabeth Allen August 10th for second opinion per grandmother.     Thank you for the consult      Travis Almanza  12:01 PM 7/22/2018

## 2018-07-22 NOTE — ED PROVIDER NOTES
[facility]  ED  Emergency Department Encounter  Emergency Medicine Resident     Pt Name: Fransisco Koch  MRN: [id number]  [place] 2000  Date of evaluation: 7/22/18  PCP:  No primary care provider on file. CHIEF COMPLAINT       Chief Complaint   Patient presents with    Personal Problem      priapism. Hx of it. HISTORY OF PRESENT ILLNESS  (Location/Symptom, Timing/Onset, Context/Setting, Quality, Duration, Modifying Factors, Severity.)      Frnasisco Koch is a 19 y.o. male who presents with Priapism. Patient has been seen multiple times for same complaint. Seen recently on 7/17. Patient has required drainage. Patient denies known history of sickle cell disease. Concern over possible medication induced secondary to Abilify, which he still takes as he has not yet gotten in to see someone for medication change. .  Patient reports she woke up around 7 AM this morning with painful erection. Denies dysuria, hematuria, penile discharge. PAST MEDICAL / SURGICAL / SOCIAL / FAMILY HISTORY      has a past medical history of History of brain shunt and Subarachnoid cyst.     has a past surgical history that includes Ventriculoperitoneal shunt. Social History     Social History    Marital status: Single     Spouse name: N/A    Number of children: N/A    Years of education: N/A     Occupational History    Not on file. Social History Main Topics    Smoking status: Current Every Day Smoker     Packs/day: 1.00     Types: Cigarettes    Smokeless tobacco: Never Used      Comment: smokes a pack every 3 days    Alcohol use No    Drug use: No    Sexual activity: Not on file     Other Topics Concern    Not on file     Social History Narrative    No narrative on file       No family history on file. Allergies:  No known allergies    Home Medications:  Prior to Admission medications    Medication Sig Start Date End Date Taking?  Authorizing Provider

## 2018-07-22 NOTE — ED NOTES
Pt resting in bed with no distress. Priapism is gone. Pt being watched on monitor. Pt has no distress.  Pt explained to stop Abilify or wellbutrin     Meagan Portillo RN  07/22/18 2279

## 2018-07-22 NOTE — ED PROVIDER NOTES
[facility]  ED     Emergency Department     Faculty Attestation    I performed a history and physical examination of the patient and discussed management with the resident. I reviewed the residents note and agree with the documented findings and plan of care. Any areas of disagreement are noted on the chart. I was personally present for the key portions of any procedures. I have documented in the chart those procedures where I was not present during the key portions. I have reviewed the emergency nurses triage note. I agree with the chief complaint, past medical history, past surgical history, allergies, medications, social and family history as documented unless otherwise noted below. For Physician Assistant/ Nurse Practitioner cases/documentation I have personally evaluated this patient and have completed at least one if not all key elements of the E/M (history, physical exam, and MDM). Additional findings are as noted. Priapism, multiple visits for same recently.   Will call urology      Critical Care     none    Mulugeta Celaya MD, Henrietta Terry  Attending Emergency  Physician             Mulugeta Celaya MD  07/22/18 [id number]

## 2018-07-25 ENCOUNTER — HOSPITAL ENCOUNTER (EMERGENCY)
Age: 18
Discharge: HOME | End: 2018-07-25
Attending: EMERGENCY MEDICINE
Payer: MEDICARE

## 2018-07-25 VITALS
HEART RATE: 56 BPM | DIASTOLIC BLOOD PRESSURE: 80 MMHG | TEMPERATURE: 97.3 F | SYSTOLIC BLOOD PRESSURE: 134 MMHG | OXYGEN SATURATION: 97 % | RESPIRATION RATE: 16 BRPM

## 2018-07-25 DIAGNOSIS — N48.30 PRIAPISM: Primary | ICD-10-CM

## 2018-07-25 LAB
HGB ELECTROPHORESIS INTERP: NORMAL
PATHOLOGIST: NORMAL

## 2018-07-25 PROCEDURE — 99284 EMERGENCY DEPT VISIT MOD MDM: CPT

## 2018-07-25 PROCEDURE — 83020 HEMOGLOBIN ELECTROPHORESIS: CPT

## 2018-07-25 RX ORDER — 0.9 % SODIUM CHLORIDE 0.9 %
VIAL (ML) INJECTION
Status: DISCONTINUED
Start: 2018-07-25 | End: 2018-07-25 | Stop reason: HOSPADM

## 2018-07-25 RX ORDER — LIDOCAINE HYDROCHLORIDE 10 MG/ML
INJECTION, SOLUTION INFILTRATION; PERINEURAL
Status: DISCONTINUED
Start: 2018-07-25 | End: 2018-07-25 | Stop reason: HOSPADM

## 2018-07-25 RX ORDER — PHENYLEPHRINE HYDROCHLORIDE 10 MG/ML
INJECTION INTRAVENOUS
Status: DISCONTINUED
Start: 2018-07-25 | End: 2018-07-25 | Stop reason: HOSPADM

## 2018-07-25 ASSESSMENT — PAIN SCALES - GENERAL: PAINLEVEL_OUTOF10: 8

## 2018-07-25 ASSESSMENT — ENCOUNTER SYMPTOMS
VOMITING: 0
NAUSEA: 0
BACK PAIN: 0
COLOR CHANGE: 0
ABDOMINAL PAIN: 0
SHORTNESS OF BREATH: 0
DIARRHEA: 0

## 2018-07-25 NOTE — CONSULTS
N/A     Occupational History    Not on file. Social History Main Topics    Smoking status: Current Every Day Smoker     Packs/day: 1.00     Types: Cigarettes    Smokeless tobacco: Never Used      Comment: smokes a pack every 3 days    Alcohol use No    Drug use: No    Sexual activity: Not on file     Other Topics Concern    Not on file     Social History Narrative    No narrative on file       Family History:  History reviewed. No pertinent family history. REVIEW OF SYSTEMS:  A comprehensive 14 point review of systems was obtained. Constitutional: No fatigue  Eyes: No blurry vision  Ears, nose, mouth, throat, face: No ringing in the ears; no facial droop. Respiratory: No cough or cold. Cardiovascular: No palpitations  Gastrointestinal: No diarrhea or constipation. Genitourinary: No burning with urination  Integument/Skin: No rashes  Hematologic/Lymphatic: No easy bruising  Musculoskeletal: No muscle pains  Neurologic: No weakness in the extremities. Psychiatric: No depression or suicidal thoughts. Endocrine: No heat or cold intolerances. Allergic/Immunologic: No current seasonal allergies; no skin hives. Physical Exam:    This a 19 y.o. male   Vitals:    07/25/18 0920   BP: 134/80   Pulse: 56   Resp: 16   Temp: 97.3 °F (36.3 °C)   SpO2: 97%     Constitutional: Patient in no acute distress. Neuro: alert and oriented to person place and time. Head: Atraumatic and normocephalic. Neck: Trachea midline   Ext: 2+ radial pulses bilaterally. Psych: Mood and affect normal.  Skin: No rashes or bruising present. Lungs: Respiratory effort normal.  Cardiovascular:  Regular rhythm. Abdomen: Soft, non-tender, non-distended. Neither side has CVA tenderness on exam.  Bladder non-tender and not distended. Lymphatics: no palpable lymphadenopathy.   Penis normal and circumcised, persistent painful erection on exam  Urethral meatus normal  Scrotal exam normal  Testicles normal bilaterally  Epididymis normal bilaterally    Labs:  No results for input(s): WBC, HGB, HCT, MCV, PLT in the last 72 hours. No results for input(s): NA, K, CL, CO2, PHOS, BUN, CREATININE in the last 72 hours. Invalid input(s): CA    No results for input(s): COLORU, PHUR, LABCAST, WBCUA, RBCUA, MUCUS, TRICHOMONAS, YEAST, BACTERIA, CLARITYU, SPECGRAV, LEUKOCYTESUR, UROBILINOGEN, Kenyatta Maw in the last 72 hours. Invalid input(s): NITRATE, GLUCOSEUKETONESUAMORPHOUS    Culture Results:  [unfilled]      -----------------------------------------------------------------  Imaging Results:  No results found. Assessment and Plan   Impression:    Patient Active Problem List   Diagnosis    Priapism     Plan:   When preparing to drain and after dorsal penile nerve block, patient achieved detumescence  Will observe for 30-45 minutes, and if he remains detumescent, will okay discharge  Will need urgent follow up with psychiatrist to discuss switching antipsychotic regimen  I also again urged him to refrain from marijuana use  Will run Hgb electrophoresis as grandmother today notes family history of sickle cell      Thank you for involving us in the care of [address] [place] [address]. Should you have any questions, please do not hesitate to contact us at any time.     Mary Alvarado  11:32 AM 7/25/2018

## 2018-07-25 NOTE — ED PROVIDER NOTES
VENTRICULOPERITONEAL SHUNT         CURRENT MEDICATIONS       Discharge Medication List as of 7/25/2018 12:08 PM      CONTINUE these medications which have NOT CHANGED    Details   acetaminophen (TYLENOL) 325 MG tablet Take 2 tablets by mouth every 6 hours as needed for Pain, Disp-30 tablet, R-0Print      ondansetron (ZOFRAN ODT) 4 MG disintegrating tablet Take 1 tablet by mouth every 8 hours as needed for Nausea, Disp-20 tablet, R-0Print      ARIPiprazole (ABILIFY) 9.75 MG/1.3ML injection Inject 9.75 mg into the muscle onceHistorical Med             ALLERGIES     No known allergies  Reviewed  FAMILY HISTORY     History reviewed. No pertinent family history. No family status information on file. SOCIAL HISTORY      reports that he has been smoking Cigarettes. He has been smoking about 1.00 pack per day. He has never used smokeless tobacco. He reports that he does not drink alcohol or use drugs. PHYSICAL EXAM    (up to 7 for level 4, 8 or more for level 5)     Vitals:    07/25/18 0920   BP: 134/80   Pulse: 56   Resp: 16   Temp: 97.3 °F (36.3 °C)   TempSrc: Oral   SpO2: 97%       Physical Exam   Constitutional: He is oriented to person, place, and time. He appears well-developed and well-nourished. No distress. HENT:   Head: Normocephalic and atraumatic. Eyes: Conjunctivae and EOM are normal. Pupils are equal, round, and reactive to light. Neck: Normal range of motion. Neck supple. Cardiovascular: Normal rate, regular rhythm, normal heart sounds and intact distal pulses. Exam reveals no gallop and no friction rub. No murmur heard. Pulmonary/Chest: Effort normal and breath sounds normal. No respiratory distress. He has no wheezes. He has no rales. Abdominal: Soft. Bowel sounds are normal. He exhibits no distension and no mass. There is no tenderness. There is no rebound and no guarding. Genitourinary: Testes normal. Cremasteric reflex is present. No penile erythema or penile tenderness.  No

## 2018-07-25 NOTE — ED PROVIDER NOTES
[facility]     Emergency Department     Faculty Attestation    I performed a history and physical examination of the patient and discussed management with the resident. I have reviewed and agree with the residents findings including all diagnostic interpretations, and treatment plans as written. Any areas of disagreement are noted on the chart. I was personally present for the key portions of any procedures. I have documented in the chart those procedures where I was not present during the key portions. I have reviewed the emergency nurses triage note. I agree with the chief complaint, past medical history, past surgical history, allergies, medications, social and family history as documented unless otherwise noted below. Documentation of the HPI, Physical Exam and Medical Decision Making performed by scribcassidy is based on my personal performance of the HPI, PE and MDM. For Physician Assistant/ Nurse Practitioner cases/documentation I have personally evaluated this patient and have completed at least one if not all key elements of the E/M (history, physical exam, and MDM). Additional findings are as noted. Primary Care Physician: No primary care provider on file. History: This is a 19 y.o. male who presents to the Emergency Department with complaint of constant erection since 6 am this am. Patient with h/o priprism multiple times over the past few months, seen one week ago, and having to have it drained by urology. He did follow up at urology clinic but grandmother did not like it there, and that he has not had a sicklecell trait workup yet. He is to follow up with Dr. Kiana Dupont (sp?) in august for evaluation. Patient does take abilify but has been on it for > 1 year, an no changes in medications recently. Patient has been able to urinate since he developed his erection. Physical:   oral temperature is 97.3 °F (36.3 °C).  His blood pressure is 134/80 and his pulse is 56. His respiration is 16 and oxygen saturation is 97%. Fully erect penis, that is tender to palpation. No testicular pain, bilateral femoral pulses are palpated and equal bilaterally, lower abdomen is soft nontender to palpation, no tenderness to other areas of his abdomen.     Impression: priaprism    Plan: Urology consult, pain control          Donald Del Castillo D.O, M.P.H  Attending Emergency Medicine Physician         Donald Del Castillo, DO  07/25/18 [id number]

## 2019-05-21 ENCOUNTER — HOSPITAL ENCOUNTER (EMERGENCY)
Age: 19
Discharge: HOME | End: 2019-05-21
Attending: EMERGENCY MEDICINE
Payer: MEDICARE

## 2019-05-21 VITALS
WEIGHT: 175 LBS | HEIGHT: 72 IN | TEMPERATURE: 99.1 F | SYSTOLIC BLOOD PRESSURE: 106 MMHG | OXYGEN SATURATION: 93 % | HEART RATE: 85 BPM | BODY MASS INDEX: 23.7 KG/M2 | DIASTOLIC BLOOD PRESSURE: 55 MMHG | RESPIRATION RATE: 17 BRPM

## 2019-05-21 DIAGNOSIS — F19.10 DRUG ABUSE (HCC): Primary | ICD-10-CM

## 2019-05-21 PROCEDURE — 99283 EMERGENCY DEPT VISIT LOW MDM: CPT

## 2019-05-21 ASSESSMENT — ENCOUNTER SYMPTOMS
SORE THROAT: 0
NAUSEA: 0
ABDOMINAL PAIN: 0
CONSTIPATION: 0
WHEEZING: 0
BLOOD IN STOOL: 0
DIARRHEA: 0
BACK PAIN: 0
VOMITING: 0
COUGH: 0
SHORTNESS OF BREATH: 0

## 2019-05-22 NOTE — ED PROVIDER NOTES
[facility]  ED  Emergency Department Encounter  EmergencyMedicine Resident     Pt John Koehler  MRN: [id number]  [place] 2000  Date of evaluation: 5/21/19  PCP:  Alan Duncan MD    [address] [place] [address]       Chief Complaint   Patient presents with    Drug Overdose     pt smoked something this afternoon. pt was given drugs by friends. pt became combative       HISTORY OF PRESENT ILLNESS  (Location/Symptom, Timing/Onset, Context/Setting, Quality, Duration, Modifying Factors, Severity.)      Traci Hogue is a 19 y.o. male who presents with K2 overdose, the patient admits to K2 he is combative with EMS initially but has calmed down now he is in our ER. His vitals are stable, he was slightly tachycardic upon arrival but as I was talking to him he continued to calm down. His mother arrived and admitted to K2 use as well as marijuana use. PAST MEDICAL / SURGICAL / SOCIAL / FAMILY HISTORY      has a past medical history of History of brain shunt and Subarachnoid cyst.     has a past surgical history that includes Ventriculoperitoneal shunt.     Social History     Socioeconomic History    Marital status: Single     Spouse name: Not on file    Number of children: Not on file    Years of education: Not on file    Highest education level: Not on file   Occupational History    Not on file   Social Needs    Financial resource strain: Not on file    Food insecurity:     Worry: Not on file     Inability: Not on file    Transportation needs:     Medical: Not on file     Non-medical: Not on file   Tobacco Use    Smoking status: Current Every Day Smoker     Packs/day: 1.00     Types: Cigarettes    Smokeless tobacco: Never Used    Tobacco comment: smokes a pack every 3 days   Substance and Sexual Activity    Alcohol use: No    Drug use: No    Sexual activity: Not on file   Lifestyle    Physical activity:     Days per week: Not on file     Minutes per session: Not on file    106/55   Pulse 85   Temp 99.1 °F (37.3 °C) (Oral)   Resp 17   Ht 6' (1.829 m)   Wt 175 lb (79.4 kg)   SpO2 93%   BMI 23.73 kg/m²     Physical Exam   Constitutional: He is oriented to person, place, and time. He appears well-developed and well-nourished. No distress. HENT:   Head: Normocephalic and atraumatic. Nose: Nose normal.   Neck: Normal range of motion. Cardiovascular: Normal rate and regular rhythm. Pulmonary/Chest: Effort normal and breath sounds normal.   Abdominal: He exhibits no distension. Musculoskeletal: He exhibits no tenderness or deformity. Neurological: He is alert and oriented to person, place, and time. Skin: Skin is warm and dry. He is not diaphoretic. No erythema. Psychiatric: He has a normal mood and affect. His behavior is normal.       DIFFERENTIAL  DIAGNOSIS     PLAN (LABS / IMAGING / EKG):  No orders of the defined types were placed in this encounter. MEDICATIONS ORDERED:  No orders of the defined types were placed in this encounter. DDX: Ingestion, infectious, trauma, seizure, AMS, electrolytes, encephalopathy, insulin, opiates, uremia, toxins, tumor, thyrotoxicosis, psychiatric, sepsis, stroke, N/V, seizure, headache, elderly age, alcohol / drugs / toxidromes, signs of trauma      DIAGNOSTIC RESULTS / [facility] / [facility]     LABS:  No results found for this visit on 05/21/19. RADIOLOGY:     No results found. [facility]/EMERGENCY DEPARTMENT COURSE:      ED Course as of May 21 2315   Tue May 21, 2019   2308 Patient was combative, was running around [place], patient arrived he had calmed down significantly he admitted he had done due to his mother arrived and stated he had only done a small amount of K2. He has had a history of psychiatric disturbance as well as K2 abuse. Patient currently resting comfortably on the cot in no acute distress. Given water and tolerating that well.     [KW]   1220 Patient resting comfortably on the bed, in no

## 2019-05-22 NOTE — ED NOTES
Bed: 14  Expected date: 5/21/19  Expected time: 10:07 PM  Means of arrival:   Comments:  M21     Mary Barcenas RN  05/21/19 4483

## 2019-06-14 ENCOUNTER — HOSPITAL ENCOUNTER (OUTPATIENT)
Dept: MRI IMAGING | Age: 19
Discharge: HOME | End: 2019-06-16
Payer: MEDICARE

## 2019-06-14 ENCOUNTER — HOSPITAL ENCOUNTER (OUTPATIENT)
Age: 19
Discharge: HOME | End: 2019-06-14
Payer: MEDICARE

## 2019-06-14 DIAGNOSIS — G93.0 SUBARACHNOID CYST: ICD-10-CM

## 2019-06-14 LAB
ABSOLUTE EOS #: 0.07 K/UL (ref 0–0.44)
ABSOLUTE IMMATURE GRANULOCYTE: 0.04 K/UL (ref 0–0.3)
ABSOLUTE LYMPH #: 1.36 K/UL (ref 1.2–5.2)
ABSOLUTE MONO #: 0.71 K/UL (ref 0.1–1.4)
ALBUMIN SERPL-MCNC: 4.7 G/DL (ref 3.5–5.2)
ALBUMIN/GLOBULIN RATIO: 1.4 (ref 1–2.5)
ALP BLD-CCNC: 73 U/L (ref 40–129)
ALT SERPL-CCNC: 24 U/L (ref 5–41)
ANION GAP SERPL CALCULATED.3IONS-SCNC: 14 MMOL/L (ref 9–17)
AST SERPL-CCNC: 28 U/L
BASOPHILS # BLD: 0 % (ref 0–2)
BASOPHILS ABSOLUTE: 0.04 K/UL (ref 0–0.2)
BILIRUB SERPL-MCNC: 1.04 MG/DL (ref 0.3–1.2)
BILIRUBIN DIRECT: 0.21 MG/DL
BILIRUBIN, INDIRECT: 0.83 MG/DL (ref 0–1)
BUN BLDV-MCNC: 13 MG/DL (ref 6–20)
CALCIUM SERPL-MCNC: 9.9 MG/DL (ref 8.6–10.4)
CHLORIDE BLD-SCNC: 105 MMOL/L (ref 98–107)
CHOLESTEROL/HDL RATIO: 2.8
CHOLESTEROL: 223 MG/DL
CO2: 24 MMOL/L (ref 20–31)
CREAT SERPL-MCNC: 0.83 MG/DL (ref 0.7–1.2)
DIFFERENTIAL TYPE: ABNORMAL
EOSINOPHILS RELATIVE PERCENT: 1 % (ref 1–4)
GFR AFRICAN AMERICAN: >60 ML/MIN
GFR NON-AFRICAN AMERICAN: >60 ML/MIN
GFR SERPL CREATININE-BSD FRML MDRD: NORMAL ML/MIN/{1.73_M2}
GFR SERPL CREATININE-BSD FRML MDRD: NORMAL ML/MIN/{1.73_M2}
GLUCOSE BLD-MCNC: 80 MG/DL (ref 70–99)
HCT VFR BLD CALC: 49.4 % (ref 40.7–50.3)
HDLC SERPL-MCNC: 80 MG/DL
HEMOGLOBIN: 15.4 G/DL (ref 13–17)
IMMATURE GRANULOCYTES: 0 %
LDL CHOLESTEROL: 129 MG/DL (ref 0–130)
LYMPHOCYTES # BLD: 12 % (ref 25–45)
MCH RBC QN AUTO: 31.2 PG (ref 25.2–33.5)
MCHC RBC AUTO-ENTMCNC: 31.2 G/DL (ref 28.4–34.8)
MCV RBC AUTO: 100.2 FL (ref 82.6–102.9)
MONOCYTES # BLD: 6 % (ref 2–8)
NRBC AUTOMATED: 0 PER 100 WBC
PDW BLD-RTO: 13.3 % (ref 11.8–14.4)
PLATELET # BLD: 250 K/UL (ref 138–453)
PLATELET ESTIMATE: ABNORMAL
PMV BLD AUTO: 9.4 FL (ref 8.1–13.5)
POTASSIUM SERPL-SCNC: 4.1 MMOL/L (ref 3.7–5.3)
RBC # BLD: 4.93 M/UL (ref 4.21–5.77)
RBC # BLD: ABNORMAL 10*6/UL
SEDIMENTATION RATE, ERYTHROCYTE: 5 MM (ref 0–10)
SEG NEUTROPHILS: 81 % (ref 34–64)
SEGMENTED NEUTROPHILS ABSOLUTE COUNT: 9.48 K/UL (ref 1.8–8)
SODIUM BLD-SCNC: 143 MMOL/L (ref 135–144)
THYROXINE, FREE: 1.34 NG/DL (ref 0.93–1.7)
TOTAL PROTEIN: 8.1 G/DL (ref 6.4–8.3)
TRIGL SERPL-MCNC: 71 MG/DL
TSH SERPL DL<=0.05 MIU/L-ACNC: 1.6 MIU/L (ref 0.3–5)
VLDLC SERPL CALC-MCNC: ABNORMAL MG/DL (ref 1–30)
WBC # BLD: 11.7 K/UL (ref 4.5–13.5)
WBC # BLD: ABNORMAL 10*3/UL

## 2019-06-14 PROCEDURE — 84439 ASSAY OF FREE THYROXINE: CPT

## 2019-06-14 PROCEDURE — 82248 BILIRUBIN DIRECT: CPT

## 2019-06-14 PROCEDURE — 85025 COMPLETE CBC W/AUTO DIFF WBC: CPT

## 2019-06-14 PROCEDURE — 85651 RBC SED RATE NONAUTOMATED: CPT

## 2019-06-14 PROCEDURE — 80053 COMPREHEN METABOLIC PANEL: CPT

## 2019-06-14 PROCEDURE — 6360000004 HC RX CONTRAST MEDICATION: Performed by: PEDIATRICS

## 2019-06-14 PROCEDURE — 70553 MRI BRAIN STEM W/O & W/DYE: CPT

## 2019-06-14 PROCEDURE — 36415 COLL VENOUS BLD VENIPUNCTURE: CPT

## 2019-06-14 PROCEDURE — 80061 LIPID PANEL: CPT

## 2019-06-14 PROCEDURE — 84443 ASSAY THYROID STIM HORMONE: CPT

## 2019-06-14 PROCEDURE — A9576 INJ PROHANCE MULTIPACK: HCPCS | Performed by: PEDIATRICS

## 2019-06-14 RX ADMIN — GADOTERIDOL 16 ML: 279.3 INJECTION, SOLUTION INTRAVENOUS at 10:16

## 2019-10-07 ENCOUNTER — HOSPITAL ENCOUNTER (EMERGENCY)
Age: 19
Discharge: HOME | End: 2019-10-07
Attending: EMERGENCY MEDICINE
Payer: MEDICARE

## 2019-10-07 VITALS
DIASTOLIC BLOOD PRESSURE: 81 MMHG | HEART RATE: 70 BPM | TEMPERATURE: 97.9 F | SYSTOLIC BLOOD PRESSURE: 120 MMHG | OXYGEN SATURATION: 96 % | RESPIRATION RATE: 18 BRPM

## 2019-10-07 DIAGNOSIS — F19.90 DRUG USE: Primary | ICD-10-CM

## 2019-10-07 PROCEDURE — 99285 EMERGENCY DEPT VISIT HI MDM: CPT

## 2019-10-07 ASSESSMENT — ENCOUNTER SYMPTOMS
ABDOMINAL PAIN: 0
NAUSEA: 0
VOMITING: 0
SHORTNESS OF BREATH: 0

## 2020-05-11 NOTE — ED NOTES
PT RETURNED FROM XRAY-PT WATCHING tv WITHOUT DISTRESS.   No SZ activity noted since arrival.      Kelli Joshi RN  03/26/18 0684 Patient : Juan Pablo Berrios Age: 76 year old Sex: male   MRN: [id number] Encounter Date: 5/11/2020      History     Chief Complaint   Patient presents with   • Flank Pain     HPI  Juan Pablo Berrios is a 76 year old male with a past medical history significant for hypertension, urinary retention, chronic kidney disease, nephrolithiasis, COPD, and asthma who presents to the emergency department for evaluation of left sided flank pain. Approximately one week ago, patient experienced sciatic pain that radiated down his left leg. He states this pain has since subsided, and was typical for his usual sciatic-type pain. At that time, he also developed new left sided upper flank pain that was not severe. He has not experienced a pain in this area before. This morning, patient states he was getting up from the toilet when he lost his balance, and while he was standing up with his weight primarily on the left foot, he felt \"a snap\" in the left side of his back at the exact site where his pain was before. His pain has been much worse since then. It worsens with positional changes and with turning. He is able to ambulate but slowly, and walking does cause worse pain. Among his other symptoms, he endorses cough and congestion associated with his COPD which is at baseline. He denies fevers, chills, cold symptoms, chest pain, difficulty breathing, dyspnea worse than baseline, nausea, vomiting, diarrhea, constipation, abdominal pain, dysuria, hematuria, numbness or tingling in lower extremities. He endorses use of 600 mg Tylenol a day and CBD oil without relief.  He is not anticoagulated. He denies history of kidney problems. Patient resides at home with his wife and his daughter's family. Due to his risk factors he has been self-quarantining against the current coronavirus pandemic.    Allergies   Allergen Reactions   • Shell Fish HIVES and ANAPHYLAXIS   • Banana VOMITING       Current Discharge Medication List      Prior to Admission  Medications    Details   fluticasone (FLONASE) 50 MCG/ACT nasal spray USE 1 SPRAY IN EACH NOSTRIL DAILY  Qty: 32 g, Refills: 1      lisinopril (ZESTRIL) 20 MG tablet TAKE 1 TABLET BY MOUTH TWO  TIMES DAILY  Qty: 180 tablet, Refills: 1      omeprazole (PRILOSEC) 40 MG capsule TAKE 1 CAPSULE BY MOUTH  DAILY  Qty: 90 capsule, Refills: 1      hydrochlorothiazide (HYDRODIURIL) 12.5 MG tablet TAKE 1 TABLET BY MOUTH  DAILY  Qty: 90 tablet, Refills: 1      DULoxetine (CYMBALTA) 30 MG capsule Take 1 capsule by mouth 2 times daily.  Qty: 180 capsule, Refills: 1      gabapentin (NEURONTIN) 300 MG capsule Take 1 capsule by mouth 2 times daily.  Qty: 180 capsule, Refills: 1      omeprazole (PRILOSEC) 40 MG capsule Take 1 capsule by mouth daily.  Qty: 90 capsule, Refills: 1      hydrochlorothiazide (HYDRODIURIL) 12.5 MG tablet Take 1 tablet by mouth daily.  Qty: 90 tablet, Refills: 1      fluticasone-salmeterol (ADVAIR DISKUS) 250-50 MCG/DOSE inhaler Inhale 1 puff into the lungs two times daily. Indications: Chronic Obstructive Lung Disease  Qty: 180 each, Refills: 3      umeclidinium bromide (INCRUSE ELLIPTA) 62.5 MCG/INH inhaler Inhale 1 puff into the lungs daily. Indications: Chronic Obstructive Lung Disease  Qty: 90 each, Refills: 3      albuterol 108 (90 Base) MCG/ACT inhaler Inhale 2 puffs into the lungs every 4 hours as needed for Shortness of Breath or Wheezing. Indications: Chronic Obstructive Lung Disease  Qty: 3 Inhaler, Refills: 3      albuterol (VENTOLIN) (2.5 MG/3ML) 0.083% nebulizer solution Take 3 mLs by nebulization every 4 hours as needed for Wheezing or Shortness of Breath.  Qty: 360 mL, Refills: 11      predniSONE (DELTASONE) 20 MG tablet Take 1 tablet by mouth daily.  Qty: 7 tablet, Refills: 0      acetaminophen (TYLENOL) 650 MG CR tablet Take 650 mg by mouth daily.   Qty: 30 tablet, Refills: 0      cholecalciferol (VITAMIN D3) 1000 UNITS tablet Take 1,000 Units by mouth daily.      ARTIFICIAL TEARS 0.1-0.3 %  Solution Place 1-2 drops into both eyes nightly as needed.      Multiple Vitamin (MULTI VITAMIN MENS PO) Take 1 tablet by mouth daily.              Current Discharge Medication List      New Prescriptions    Details   HYDROcodone-acetaminophen (NORCO) 5-325 MG per tablet Take 1 tablet by mouth every 4 hours as needed (severe pain).  Qty: 18 tablet, Refills: 0      cyclobenzaprine (FLEXERIL) 5 MG tablet Take 1-2 tablets by mouth 3 times daily as needed for Muscle spasms.  Qty: 30 tablet, Refills: 0             Past Medical History:   Diagnosis Date   • Allergy    • Anxiety    • Cervicalgia s/p work related injury 12/3/13 7/2/2014   • Chronic kidney disease    • COPD (chronic obstructive pulmonary disease) (CMS/formerly [facility])    • Cough    • Degeneration of lumbar intervertebral disc 1/10/2014   • Essential (primary) hypertension    • Fall 01/15/2018   • Macular degeneration 2000    per patient   • LEONOR (obstructive sleep apnea)     no machine   • Other chronic pain    • Personal history of traumatic fracture 12/3/2013    Neck   • RAD (reactive airway disease)    • Shingles (herpes zoster) with neuropathy along bilateral upper back 7/2/2014   • Trouble swallowing    • Urinary incontinence        Past Surgical History:   Procedure Laterality Date   • ANTERIOR CERVICAL DISCECTOMY W/ FUSION  3/2016    Dr. Schwartz, [facility], F C5-6 extended from previous fusion C3-4, C4-5   • CERVICAL FUSION  05/15    C-3,c4   • COLONOSCOPY W BIOPSY  09/30/2010, 11/11/15    repeat in 5 years (2020), 3 polyps   • HERNIA REPAIR     • KNEE ARTHROSCOPY W/ LASER Right 6/29/16   • LUMBAR FUSION  10/04/2018    L3-4, L4-5 laminectomy and posterior fusion, Dr. Schwartz   • PAIN CLINIC      injections   • WISDOM TOOTH EXTRACTION         Family History   Problem Relation Age of Onset   • Diabetes Mother    • Arthritis Mother    • Stroke Father    • Kidney disease Father    • Arthritis Father    • Heart disease Father    • Asthma Sister    • Arthritis Sister    • Diabetes  Sister        Social History     Tobacco Use   • Smoking status: Former Smoker     Years: 4.00     Types: Pipe, Cigars     Last attempt to quit: 5/15/1985     Years since quittin.0   • Smokeless tobacco: Never Used   Substance Use Topics   • Alcohol use: Yes     Frequency: Never     Drinks per session: 1 or 2     Binge frequency: Never     Comment: On occasion   • Drug use: No       Review of Systems   CONSTITUTIONAL: Reports weakness. Denies fevers, chills.  ENT: Reports congestion. Denies cold symptoms.   CV:  Denies chest pain.  RESPIRATORY: Reports cough. Denies shortness of breath, dyspnea on exertion.  GI:  Denies nausea, vomiting, diarrhea, constipation.   : Reports flank pain. Denies dysuria, hematuria.  NEURO: Reports weakness. Denies numbness, tingling.  HEME/LYMPH:  Denies anticoagulation.   Otherwise reviewed and negative except as described in above and in HPI    Physical Exam     ED Triage Vitals [20 1015]   ED Triage Vitals Group      Temp 97.5 °F (36.4 °C)      Heart Rate 98      Resp 18      /65      SpO2 96 %      EtCO2 mmHg       Height 5' 10\" (1.778 m)      Weight 217 lb (98.4 kg)      Weight Scale Used       BMI (Calculated) 31.14      IBW/kg (Calculated) 73       Physical Exam   Physical Examination:   ED Triage Vitals   BP    Pulse    Resp    Temp    SpO2      CONSTITUTIONAL: Alert and oriented and responds appropriately to questions. Well-appearing, well-nourished; elderly and appears to be in some moderate pain.  HEAD: Normocephalic; atraumatic. No apparent abnormalities.  EENT:  Sclerae nonicteric, conjunctivae clear.   NECK: Reduced movement. Anterior surgical scar, well healed.  CHEST: No deformities.   CARDIAC: Regular rate and rhythm with normal S1 and S2 heard. No murmurs, rubs, or gallops. Normal and symmetric distal pulses.  LUNG: Respiratory rate and effort are normal. Normal chest excursion without tripoding or accessory muscle use. Lung sounds are clear to  auscultation bilaterally all lung fields; no wheezes, rales, or rhonchi.  ABDOMEN: Abdomen is soft with mild distension. Normoactive bowel sounds. Tender to deep palpation in the left lower quadrant without rebound.  BACK: Appears normal. Point tenderness of the posterior left sided ribs, around the posterior axillary line.   EXTREMITIES: Nontender to palpation; bulk and tone grossly normal. Nontender left ankle, knee, hip. No clubbing, cyanosis, effusions, edema, or rashes noted.   SKIN: Normal color for age and race. Warm and well perfused without diaphoresis, rashes or jaundice. Good turgor.  PSYCHIATRIC: Mood and manner are appropriate. Normal affect. Normal insight. Speech is normal. Grooming and personal hygiene are appropriate.  NEUROLOGICAL: Alert and oriented x4. No facial asymmetry. Normal sensation all distributions BLE. Strength with foot plantarflexion and dorsiflexion intact bilaterally. Reduced strength on straight leg raise test on left lower extremity as compared to right. Slow gait without foot drag.    ED Course     Procedures    Lab Results     Results for orders placed or performed during the hospital encounter of 05/11/20   URINALYSIS & REFLEX MICRO WITH CULTURE IF INDICATED   Result Value Ref Range    COLOR, URINALYSIS Yellow     APPEARANCE, URINALYSIS Clear     GLUCOSE, URINALYSIS Negative Negative mg/dL    BILIRUBIN, URINALYSIS Negative Negative    KETONES, URINALYSIS Negative Negative mg/dL    SPECIFIC GRAVITY, URINALYSIS 1.014 1.005 - 1.030    OCCULT BLOOD, URINALYSIS Negative Negative    PH, URINALYSIS 5.0 5.0 - 7.0    PROTEIN, URINALYSIS Negative Negative mg/dL    UROBILINOGEN, URINALYSIS 0.2 0.2, 1.0 mg/dL    NITRITE, URINALYSIS Negative Negative    LEUKOCYTE ESTERASE, URINALYSIS Negative Negative   Comprehensive Metabolic Panel   Result Value Ref Range    Fasting Status      Sodium 144 135 - 145 mmol/L    Potassium 4.8 3.4 - 5.1 mmol/L    Chloride 114 (H) 98 - 107 mmol/L    Carbon  Dioxide 24 21 - 32 mmol/L    Anion Gap 11 10 - 20 mmol/L    Glucose 47 (LL) 65 - 99 mg/dL    BUN 36 (H) 6 - 20 mg/dL    Creatinine 1.70 (H) 0.67 - 1.17 mg/dL    Glomerular Filtration Rate 38 (L) >90    BUN/ Creatinine Ratio 21 7 - 25    Bilirubin, Total 0.3 0.2 - 1.0 mg/dL    GOT/AST 26 <=37 Units/L    Alkaline Phosphatase 132 (H) 45 - 117 Units/L    Albumin 3.9 3.6 - 5.1 g/dL    Protein, Total 7.4 6.4 - 8.2 g/dL    Globulin 3.5 2.0 - 4.0 g/dL    A/G Ratio 1.1 1.0 - 2.4    GPT/ALT 34 <64 Units/L    Calcium 8.4 8.4 - 10.2 mg/dL   C Reactive Protein   Result Value Ref Range    C-Reactive Protein <0.3 <=1.0 mg/dL   CBC with Automated Differential (performable only)   Result Value Ref Range    WBC 7.8 4.2 - 11.0 K/mcL    RBC 4.29 (L) 4.50 - 5.90 mil/mcL    HGB 12.4 (L) 13.0 - 17.0 g/dL    HCT 39.7 39.0 - 51.0 %    MCV 92.5 78.0 - 100.0 fl    MCH 28.9 26.0 - 34.0 pg    MCHC 31.2 (L) 32.0 - 36.5 g/dL    RDW-CV 14.4 11.0 - 15.0 %     140 - 450 K/mcL    NRBC 0 <=0 /100 WBC    Neutrophil, Percent 54 %    Lymphocytes, Percent 33 %    Mono, Percent 8 %    Eosinophils, Percent 4 %    Basophils, Percent 1 %    Immature Granulocytes 0 %    Absolute Neutrophils 4.2 1.8 - 7.7 K/mcL    Absolute Lymphocytes 2.6 1.0 - 4.0 K/mcL    Absolute Monocytes 0.6 0.3 - 0.9 K/mcL    Absolute Eosinophils  0.3 0.1 - 0.5 K/mcL    Absolute Basophils 0.1 0.0 - 0.3 K/mcL    Absolute Immmature Granulocytes 0.0 0.0 - 0.2 K/mcL    RDW-SD 48.8 39.0 - 50.0 fL       EKG Results   None ordered.     Radiology Results     Imaging Results          CT ABDOMEN PELVIS WO CONTRAST (Final result)  Result time 05/11/20 12:58:19   Procedure changed from CT ABDOMEN PELVIS W CONTRAST     Final result                 Impression:    IMPRESSION:  1. No obstructive uropathy.  2. There is a 3 mm nonobstructing interpolar left renal calculus.  3. Bilateral renal cysts, stable compared to 20 months ago.  4. There is a large amount of stool in the colon especially the  right colon  and the rectum.  5. Redundant sigmoid colon. Sigmoid diverticulosis without diverticulitis.             Narrative:    CT ABDOMEN PELVIS WO CONTRAST    COMPARISON: 10/7/2018    HISTORY: Left flank pain. Abdominal pain. Stage III kidney disease.    PROCEDURE: 2.5 mm helical images were obtained through the abdomen and  pelvis without contrast.    FINDINGS: There is atelectasis in the lingula and left lower lobe. There  are no pleural or pericardial effusions. There are moderate coronary artery  calcifications.    No focal lesions are seen in the noncontrast enhanced liver, gallbladder.  There is a dense calcification in the spleen, stable. The pancreas is  slightly atrophic. There are no adrenal masses. Cortical cysts are seen in  both kidneys. There is no obstructive uropathy or ureteral calculus. There  is a 3 mm nonobstructing intrarenal calculus on the left.    The abdominal aorta is normal in caliber. The aorta and iliac arteries are  are tortuous. There is no retroperitoneal adenopathy. There is no  intraperitoneal free air or free fluid. The prostate is enlarged. The  seminal vesicles are symmetric. There are scattered left pelvic  phleboliths.    There is a large amount of stool in the rectum. The sigmoid colon is  redundant with multiple noninflamed diverticula. Moderate stool seen in the  transverse colon and right colon. The appendix is normal. There is no  inguinal adenopathy. Pedicle screws, posterior fusion rods and interbody  fusion devices are seen at L3-L5. Vacuum disc and endplate sclerosis are  seen at L5-S1. There is a bone island in the right ilium.                               XR Ribs Left W Pa Chest (Final result)  Result time 05/11/20 11:39:15    Final result                 Impression:    IMPRESSION:  1. Chronic elevation of the left hemidiaphragm with platelike atelectasis  in the left base.  2. No acute bony injury.             Narrative:    XR RIBS LEFT W PA  CHEST    COMPARISON: 3/22/2019    HISTORY: Left lower rib pain.    FINDINGS: The heart size and pulmonary vasculature are unremarkable. Left  hemidiaphragm is elevated. There is platelike atelectasis at the left base.  A BB marks the site of maximum pain. Multiple oblique images of the left  ribs shows no acute bony injury. There is an anterior cervical fusion in  the lower cervical spine.                                ED Medication Orders (From admission, onward)    Ordered Start     Status Ordering Provider    05/11/20 1051 05/11/20 1052  morphine injection 6 mg  ONCE      Last MAR action:  Given MOLLY NAILS               MDM  Elderly male here with apparent musculoskeletal pain of the left flank; primarily over the left lower ribs. He does have a history of spinal problems however his sciatica is under control at this time. He has never had pain like this before. He has had kidney stones but reports that during that his pain was primarily pelvic in nature \"it felt like giving birth\". I suspect he may have cracked or  a rib, or strained an intercostal muscle. VS normal. He is in pain but otherwise appears quite well given his age. Ddx includes kidney stones, less likely diverticulitis given mild LLQ abdominal tenderness identified on exam without stool symptoms. Doubt recurrent C. Diff as he has no stool complaints. Plan for basic labs, XR ribs, analgesic; consider CT imaging of the abdomen and pelvis. Given description, location, and reproducibility of pain I do not suspect AAA or other vascular catastrophe in the retroperitoneum or abdomen. He did get a ride here today and will not be driving; will provide opiate analgesia.     11:21 AM  CBC normal. UA normal. Await balance of labs and imaging.    11:50 AM  Labs with low glucose; will give PO intake. Otherwise reassuring. Normal CRP. Await completion of imaging.    1:05 PM  No evidence of worrisome findings on imaging. Interpolar stone and renal  cysts present but no ureterolithiasis. XR without bony injury. Favor musculoskeletal strain as cause of his pain. He does feel a little better after medication. Will treat with antispasmodic, limited opiate analgesia. He has tolerated the morphine well. Discussed possible follow up with PT, pain management, and his neurosurgeon for his chronic neck problem. Referrals placed. Return precautions given, patient and/or family expresses understanding and agrees with discharge and plan of care.    Clinical Impression     ED Diagnosis   1. Flank pain      Left, likely muscular strain   2. Chronic neck pain  SERVICE TO PHYSICAL THERAPY    SERVICE TO PAIN MANAGEMENT   3. Left-sided low back pain with left-sided sciatica, unspecified chronicity  SERVICE TO PHYSICAL THERAPY    SERVICE TO PAIN MANAGEMENT       Disposition        Discharge 5/11/2020  1:07 PM  Juan Pablo BENDER Blood discharge to home/self care.        This chart was documented by Dolly Kerr, acting as a scribe for Alejandra Jaimes MD. 5/11/2020, 10:17 AM.     The documentation recorded by the scribe accurately and completely reflects the service(s) I personally performed and the decisions made by me.        Alejnadra Jaimes MD  05/11/20 1981

## 2020-06-25 ENCOUNTER — OFFICE VISIT (OUTPATIENT)
Dept: PRIMARY CARE CLINIC | Age: 20
End: 2020-06-25
Payer: MEDICARE

## 2020-06-25 VITALS
TEMPERATURE: 97.9 F | HEIGHT: 72 IN | HEART RATE: 63 BPM | DIASTOLIC BLOOD PRESSURE: 69 MMHG | OXYGEN SATURATION: 98 % | WEIGHT: 178 LBS | BODY MASS INDEX: 24.11 KG/M2 | SYSTOLIC BLOOD PRESSURE: 118 MMHG

## 2020-06-25 PROCEDURE — 99395 PREV VISIT EST AGE 18-39: CPT | Performed by: INTERNAL MEDICINE

## 2020-06-25 ASSESSMENT — PATIENT HEALTH QUESTIONNAIRE - PHQ9
SUM OF ALL RESPONSES TO PHQ9 QUESTIONS 1 & 2: 0
SUM OF ALL RESPONSES TO PHQ QUESTIONS 1-9: 0
2. FEELING DOWN, DEPRESSED OR HOPELESS: 0
SUM OF ALL RESPONSES TO PHQ QUESTIONS 1-9: 0
1. LITTLE INTEREST OR PLEASURE IN DOING THINGS: 0

## 2020-06-25 NOTE — PROGRESS NOTES
[facility]  [address] [facility] [id number]  Dept: [phone]  Dept Fax: [phone]    Gregory Lyman is a 22 y.o. male who presents to the urgent care today for his medicalconditions/complaints as noted below. Gregory Lyman is c/o of Annual Exam    Needs physical for group home. HPI:     Mental Health Problem   The primary symptoms include bizarre behavior and somatic symptoms. This is a chronic problem. The degree of incapacity that he is experiencing as a consequence of his illness is moderate. Sequelae of the illness include an inability to work and an inability to care for self. He does not admit to suicidal ideas. He does not contemplate harming himself. Risk factors that are present for mental illness include a history of mental illness. Past Medical History:   Diagnosis Date    History of brain shunt     Subarachnoid cyst         Current Outpatient Medications   Medication Sig Dispense Refill    ondansetron (ZOFRAN ODT) 4 MG disintegrating tablet Take 1 tablet by mouth every 8 hours as needed for Nausea 20 tablet 0    acetaminophen (TYLENOL) 325 MG tablet Take 2 tablets by mouth every 6 hours as needed for Pain (Patient not taking: Reported on 6/25/2020) 30 tablet 0    ARIPiprazole (ABILIFY) 9.75 MG/1.3ML injection Inject 9.75 mg into the muscle once       No current facility-administered medications for this visit.       Allergies   Allergen Reactions    No Known Allergies        Health Maintenance   Topic Date Due    Varicella vaccine (1 of 2 - 2-dose childhood series) 07/08/1999    Pneumococcal 0-64 years Vaccine (1 of 1 - PPSV23) 07/08/2004    HPV vaccine (1 - Male 2-dose series) 07/08/2009    HIV screen  07/08/2013    DTaP/Tdap/Td vaccine (1 - Tdap) 07/08/2017    Flu vaccine (Season Ended) 09/01/2020    Hepatitis A vaccine  Aged Out    Hepatitis B vaccine  Aged Out    Hib vaccine  Aged Out

## 2020-06-30 ENCOUNTER — NURSE ONLY (OUTPATIENT)
Dept: PRIMARY CARE CLINIC | Age: 20
End: 2020-06-30
Payer: MEDICARE

## 2020-06-30 PROCEDURE — 86580 TB INTRADERMAL TEST: CPT | Performed by: NURSE PRACTITIONER

## 2020-07-02 ENCOUNTER — NURSE ONLY (OUTPATIENT)
Dept: PRIMARY CARE CLINIC | Age: 20
End: 2020-07-02

## 2020-07-02 LAB
INDURATION: NEGATIVE
TB SKIN TEST: 0

## 2020-07-02 NOTE — PROGRESS NOTES
PPD Reading Note  PPD read and results entered in [facility]. Result: 00 mm induration.   Interpretation: Negative  If test not read within 48-72 hours of initial placement, patient advised to repeat in other arm 1-3 weeks after this test.  Allergic reaction: yes
